# Patient Record
Sex: FEMALE | Race: WHITE | NOT HISPANIC OR LATINO | Employment: OTHER | URBAN - METROPOLITAN AREA
[De-identification: names, ages, dates, MRNs, and addresses within clinical notes are randomized per-mention and may not be internally consistent; named-entity substitution may affect disease eponyms.]

---

## 2017-08-23 ENCOUNTER — HISTORICAL (OUTPATIENT)
Dept: ADMINISTRATIVE | Facility: HOSPITAL | Age: 77
End: 2017-08-23

## 2018-02-21 LAB
BUN SERPL-MCNC: 25 MG/DL (ref 7–18)
CALCIUM SERPL-MCNC: 10.6 MG/DL (ref 8.5–10.1)
CHLORIDE SERPL-SCNC: 103 MMOL/L (ref 98–107)
CO2 SERPL-SCNC: 32 MMOL/L (ref 21–32)
CREAT SERPL-MCNC: 0.9 MG/DL (ref 0.6–1.3)
GLUCOSE SERPL-MCNC: 132 MG/DL (ref 74–106)
POTASSIUM SERPL-SCNC: 3.7 MMOL/L (ref 3.5–5.1)
SODIUM SERPL-SCNC: 143 MEQ/L (ref 131–145)

## 2018-04-09 ENCOUNTER — HISTORICAL (OUTPATIENT)
Dept: ADMINISTRATIVE | Facility: HOSPITAL | Age: 78
End: 2018-04-09

## 2018-04-09 LAB — CALCIUM SERPL-MCNC: 10.4 MG/DL (ref 8.5–10)

## 2018-10-23 ENCOUNTER — HISTORICAL (OUTPATIENT)
Dept: ADMINISTRATIVE | Facility: HOSPITAL | Age: 78
End: 2018-10-23

## 2018-10-23 LAB
BUN SERPL-MCNC: 25 MG/DL (ref 7–18)
CALCIUM SERPL-MCNC: 10.3 MG/DL (ref 8.5–10)
CHLORIDE SERPL-SCNC: 103 MMOL/L (ref 98–107)
CO2 SERPL-SCNC: 34 MMOL/L (ref 21–32)
CREAT SERPL-MCNC: 0.9 MG/DL (ref 0.6–1.3)
CREAT/UREA NIT SERPL: 27.8
GLUCOSE SERPL-MCNC: 154 MG/DL (ref 74–106)
POTASSIUM SERPL-SCNC: 4 MMOL/L (ref 3.5–5.1)
SODIUM SERPL-SCNC: 141 MMOL/L (ref 136–145)

## 2019-02-18 ENCOUNTER — HISTORICAL (OUTPATIENT)
Dept: ADMINISTRATIVE | Facility: HOSPITAL | Age: 79
End: 2019-02-18

## 2019-02-18 ENCOUNTER — HISTORICAL (OUTPATIENT)
Dept: LAB | Facility: HOSPITAL | Age: 79
End: 2019-02-18

## 2019-02-18 LAB
ABS NEUT (OLG): 8.7 X10(3)/MCL (ref 2.1–9.2)
ALBUMIN SERPL-MCNC: 4 GM/DL (ref 3.4–5)
ALBUMIN/GLOB SERPL: 1.38 {RATIO} (ref 1.5–2.5)
ALP SERPL-CCNC: 63 UNIT/L (ref 38–126)
ALT SERPL-CCNC: 15 UNIT/L (ref 7–52)
APPEARANCE, UA: ABNORMAL
AST SERPL-CCNC: 15 UNIT/L (ref 15–37)
BACTERIA #/AREA URNS AUTO: ABNORMAL /HPF
BILIRUB SERPL-MCNC: 0.5 MG/DL (ref 0.2–1)
BILIRUB UR QL STRIP: NEGATIVE MG/DL
BILIRUBIN DIRECT+TOT PNL SERPL-MCNC: 0.1 MG/DL (ref 0–0.5)
BILIRUBIN DIRECT+TOT PNL SERPL-MCNC: 0.4 MG/DL
BUN SERPL-MCNC: 25 MG/DL (ref 7–18)
CALCIUM SERPL-MCNC: 10.2 MG/DL (ref 8.5–10)
CHLORIDE SERPL-SCNC: 99 MMOL/L (ref 98–107)
CHOLEST SERPL-MCNC: 187 MG/DL (ref 0–200)
CHOLEST/HDLC SERPL: 3.3 {RATIO}
CO2 SERPL-SCNC: 31 MMOL/L (ref 21–32)
COLOR UR: YELLOW
CREAT SERPL-MCNC: 1.24 MG/DL (ref 0.6–1.3)
ERYTHROCYTE [DISTWIDTH] IN BLOOD BY AUTOMATED COUNT: 12.9 % (ref 11.5–17)
EST. AVERAGE GLUCOSE BLD GHB EST-MCNC: 143 MG/DL
GLOBULIN SER-MCNC: 2.9 GM/DL (ref 1.2–3)
GLUCOSE (UA): NEGATIVE MG/DL
GLUCOSE SERPL-MCNC: 177 MG/DL (ref 74–106)
HBA1C MFR BLD: 6.6 % (ref 4.4–6.4)
HCT VFR BLD AUTO: 41.6 % (ref 37–47)
HDLC SERPL-MCNC: 57 MG/DL (ref 35–60)
HGB BLD-MCNC: 12.8 GM/DL (ref 12–16)
HGB UR QL STRIP: ABNORMAL UNIT/L
KETONES UR QL STRIP: NEGATIVE MG/DL
LDLC SERPL CALC-MCNC: 90 MG/DL (ref 0–129)
LEUKOCYTE ESTERASE UR QL STRIP: NEGATIVE UNIT/L
LYMPHOCYTES # BLD AUTO: 1.7 X10(3)/MCL (ref 0.6–3.4)
LYMPHOCYTES NFR BLD AUTO: 14.5 % (ref 13–40)
MCH RBC QN AUTO: 29 PG (ref 27–31.2)
MCHC RBC AUTO-ENTMCNC: 31 GM/DL (ref 32–36)
MCV RBC AUTO: 94 FL (ref 80–94)
MONOCYTES # BLD AUTO: 1 X10(3)/MCL (ref 0.1–1.3)
MONOCYTES NFR BLD AUTO: 9.2 % (ref 0.1–24)
NEUTROPHILS NFR BLD AUTO: 76.3 % (ref 47–80)
NITRITE UR QL STRIP.AUTO: NEGATIVE
PH UR STRIP: 5 [PH]
PLATELET # BLD AUTO: 294 X10(3)/MCL (ref 130–400)
PMV BLD AUTO: 10.6 FL (ref 9.4–12.4)
POTASSIUM SERPL-SCNC: 3.9 MMOL/L (ref 3.5–5.1)
PROT SERPL-MCNC: 6.9 GM/DL (ref 6.4–8.2)
PROT UR QL STRIP: NEGATIVE MG/DL
RBC # BLD AUTO: 4.41 X10(6)/MCL (ref 4.2–5.4)
RBC #/AREA URNS HPF: ABNORMAL /HPF
SODIUM SERPL-SCNC: 138 MMOL/L (ref 136–145)
SP GR UR STRIP: 1.02
SQUAMOUS EPITHELIAL, UA: ABNORMAL /LPF
TRIGL SERPL-MCNC: 178 MG/DL (ref 30–150)
UROBILINOGEN UR STRIP-ACNC: 0.2 MG/DL
VLDLC SERPL CALC-MCNC: 35.6 MG/DL
WBC # SPEC AUTO: 11.4 X10(3)/MCL (ref 4.5–11.5)
WBC #/AREA URNS AUTO: ABNORMAL /[HPF]

## 2019-02-19 ENCOUNTER — HISTORICAL (OUTPATIENT)
Dept: LAB | Facility: HOSPITAL | Age: 79
End: 2019-02-19

## 2019-02-21 LAB — FINAL CULTURE: NORMAL

## 2019-03-27 ENCOUNTER — HISTORICAL (OUTPATIENT)
Dept: LAB | Facility: HOSPITAL | Age: 79
End: 2019-03-27

## 2019-03-27 ENCOUNTER — HISTORICAL (OUTPATIENT)
Dept: ADMINISTRATIVE | Facility: HOSPITAL | Age: 79
End: 2019-03-27

## 2019-03-27 LAB
ALBUMIN SERPL-MCNC: 3.8 GM/DL (ref 3.4–5)
CALCIUM SERPL-MCNC: 9.6 MG/DL (ref 8.5–10)
PROT SERPL-MCNC: 6.4 GM/DL (ref 6.4–8.2)
PTH-INTACT SERPL-MCNC: 167.9 PG/ML (ref 18.4–80.1)

## 2019-03-28 LAB
CREAT SERPL-MCNC: 1.07 MG/DL (ref 0.6–1.3)
DEPRECATED CALCIDIOL+CALCIFEROL SERPL-MC: 19.3 NG/ML (ref 30–80)

## 2019-05-06 ENCOUNTER — HISTORICAL (OUTPATIENT)
Dept: ADMINISTRATIVE | Facility: HOSPITAL | Age: 79
End: 2019-05-06

## 2019-05-06 ENCOUNTER — HISTORICAL (OUTPATIENT)
Dept: LAB | Facility: HOSPITAL | Age: 79
End: 2019-05-06

## 2019-05-06 LAB
CALCIUM SERPL-MCNC: 8.7 MG/DL (ref 8.5–10)
MAGNESIUM SERPL-MCNC: 2.2 MG/DL (ref 1.8–2.4)
PHOSPHATE SERPL-MCNC: 2.9 MG/DL (ref 2.5–4.9)
PTH-INTACT SERPL-MCNC: 47.4 PG/ML (ref 18.4–80.1)

## 2019-11-21 ENCOUNTER — HISTORICAL (OUTPATIENT)
Dept: ADMINISTRATIVE | Facility: HOSPITAL | Age: 79
End: 2019-11-21

## 2019-11-21 LAB
ALBUMIN SERPL-MCNC: 4.2 GM/DL (ref 3.4–5)
ALBUMIN/GLOB SERPL: 1.14 {RATIO} (ref 1.5–2.5)
ALP SERPL-CCNC: 65 UNIT/L (ref 38–126)
ALT SERPL-CCNC: 18 UNIT/L (ref 7–52)
AST SERPL-CCNC: 18 UNIT/L (ref 15–37)
BILIRUB SERPL-MCNC: 0.4 MG/DL (ref 0.2–1)
BILIRUBIN DIRECT+TOT PNL SERPL-MCNC: 0.1 MG/DL (ref 0–0.5)
BILIRUBIN DIRECT+TOT PNL SERPL-MCNC: 0.3 MG/DL
BUN SERPL-MCNC: 24 MG/DL (ref 7–18)
CALCIUM SERPL-MCNC: 9.2 MG/DL (ref 8.5–10)
CHLORIDE SERPL-SCNC: 102 MMOL/L (ref 98–107)
CO2 SERPL-SCNC: 34 MMOL/L (ref 21–32)
CREAT SERPL-MCNC: 1.24 MG/DL (ref 0.6–1.3)
EST. AVERAGE GLUCOSE BLD GHB EST-MCNC: 146 MG/DL
GLOBULIN SER-MCNC: 3.7 GM/DL (ref 1.2–3)
GLUCOSE SERPL-MCNC: 119 MG/DL (ref 74–106)
HBA1C MFR BLD: 6.7 % (ref 4.4–6.4)
POTASSIUM SERPL-SCNC: 3.6 MMOL/L (ref 3.5–5.1)
PROT SERPL-MCNC: 7.9 GM/DL (ref 6.4–8.2)
SODIUM SERPL-SCNC: 142 MMOL/L (ref 136–145)

## 2020-02-06 ENCOUNTER — HISTORICAL (OUTPATIENT)
Dept: ADMINISTRATIVE | Facility: HOSPITAL | Age: 80
End: 2020-02-06

## 2020-02-06 LAB
ALBUMIN SERPL-MCNC: 3.8 GM/DL (ref 3.4–5)
ALP SERPL-CCNC: 51 UNIT/L (ref 38–126)
ALT SERPL-CCNC: 18 UNIT/L (ref 7–52)
AST SERPL-CCNC: 19 UNIT/L (ref 15–37)
BILIRUB SERPL-MCNC: 0.4 MG/DL (ref 0.2–1)
BILIRUBIN DIRECT+TOT PNL SERPL-MCNC: 0.1 MG/DL (ref 0–0.5)
BILIRUBIN DIRECT+TOT PNL SERPL-MCNC: 0.3 MG/DL
CHOLEST SERPL-MCNC: 173 MG/DL (ref 0–200)
CHOLEST/HDLC SERPL: 3 {RATIO}
HDLC SERPL-MCNC: 57 MG/DL (ref 35–60)
LDLC SERPL CALC-MCNC: 99 MG/DL (ref 0–129)
PROT SERPL-MCNC: 6.5 GM/DL (ref 6.4–8.2)
TRIGL SERPL-MCNC: 102 MG/DL (ref 30–150)
VLDLC SERPL CALC-MCNC: 20.4 MG/DL

## 2020-07-10 ENCOUNTER — HISTORICAL (OUTPATIENT)
Dept: ADMINISTRATIVE | Facility: HOSPITAL | Age: 80
End: 2020-07-10

## 2020-07-10 LAB
ABS NEUT (OLG): 3.3 X10(3)/MCL (ref 2.1–9.2)
ALBUMIN SERPL-MCNC: 3.9 GM/DL (ref 3.4–5)
ALBUMIN/GLOB SERPL: 1.3 {RATIO} (ref 1.5–2.5)
ALP SERPL-CCNC: 59 UNIT/L (ref 38–126)
ALT SERPL-CCNC: 15 UNIT/L (ref 7–52)
AST SERPL-CCNC: 16 UNIT/L (ref 15–37)
BILIRUB SERPL-MCNC: 0.4 MG/DL (ref 0.2–1)
BILIRUBIN DIRECT+TOT PNL SERPL-MCNC: 0.1 MG/DL (ref 0–0.5)
BILIRUBIN DIRECT+TOT PNL SERPL-MCNC: 0.3 MG/DL
BUN SERPL-MCNC: 28 MG/DL (ref 7–18)
CALCIUM SERPL-MCNC: 10 MG/DL (ref 8.5–10)
CHLORIDE SERPL-SCNC: 101 MMOL/L (ref 98–107)
CHOLEST SERPL-MCNC: 165 MG/DL (ref 0–200)
CHOLEST/HDLC SERPL: 2.9 {RATIO}
CO2 SERPL-SCNC: 33 MMOL/L (ref 21–32)
CREAT SERPL-MCNC: 1.02 MG/DL (ref 0.6–1.3)
DEPRECATED CALCIDIOL+CALCIFEROL SERPL-MC: 20.6 NG/ML (ref 30–80)
ERYTHROCYTE [DISTWIDTH] IN BLOOD BY AUTOMATED COUNT: 13.2 % (ref 11.5–17)
EST. AVERAGE GLUCOSE BLD GHB EST-MCNC: 148 MG/DL
GLOBULIN SER-MCNC: 3 GM/DL (ref 1.2–3)
GLUCOSE SERPL-MCNC: 157 MG/DL (ref 74–106)
HBA1C MFR BLD: 6.8 % (ref 4.4–6.4)
HCT VFR BLD AUTO: 39.7 % (ref 37–47)
HDLC SERPL-MCNC: 56 MG/DL (ref 35–60)
HGB BLD-MCNC: 12.5 GM/DL (ref 12–16)
LDLC SERPL CALC-MCNC: 82 MG/DL (ref 0–129)
LYMPHOCYTES # BLD AUTO: 2.5 X10(3)/MCL (ref 0.6–3.4)
LYMPHOCYTES NFR BLD AUTO: 38.4 % (ref 13–40)
MCH RBC QN AUTO: 28.2 PG (ref 27–31.2)
MCHC RBC AUTO-ENTMCNC: 32 GM/DL (ref 32–36)
MCV RBC AUTO: 89 FL (ref 80–94)
MONOCYTES # BLD AUTO: 0.6 X10(3)/MCL (ref 0.1–1.3)
MONOCYTES NFR BLD AUTO: 9.3 % (ref 0.1–24)
NEUTROPHILS NFR BLD AUTO: 52.3 % (ref 47–80)
PLATELET # BLD AUTO: 276 X10(3)/MCL (ref 130–400)
PMV BLD AUTO: 10.9 FL (ref 9.4–12.4)
POTASSIUM SERPL-SCNC: 3.6 MMOL/L (ref 3.5–5.1)
PROT SERPL-MCNC: 6.9 GM/DL (ref 6.4–8.2)
RBC # BLD AUTO: 4.44 X10(6)/MCL (ref 4.2–5.4)
SODIUM SERPL-SCNC: 143 MMOL/L (ref 136–145)
TRIGL SERPL-MCNC: 135 MG/DL (ref 30–150)
VLDLC SERPL CALC-MCNC: 27 MG/DL
WBC # SPEC AUTO: 6.4 X10(3)/MCL (ref 4.5–11.5)

## 2020-12-23 ENCOUNTER — HISTORICAL (OUTPATIENT)
Dept: ADMINISTRATIVE | Facility: HOSPITAL | Age: 80
End: 2020-12-23

## 2020-12-23 LAB
DEPRECATED CALCIDIOL+CALCIFEROL SERPL-MC: 36.5 NG/ML (ref 30–80)
EST. AVERAGE GLUCOSE BLD GHB EST-MCNC: 166 MG/DL
HBA1C MFR BLD: 7.4 % (ref 4.4–6.4)
TSH SERPL-ACNC: 3.52 MIU/ML (ref 0.35–4.94)

## 2021-02-26 ENCOUNTER — HISTORICAL (OUTPATIENT)
Dept: ADMINISTRATIVE | Facility: HOSPITAL | Age: 81
End: 2021-02-26

## 2021-02-26 LAB — URATE SERPL-MCNC: 8.8 MG/DL (ref 2.6–7.2)

## 2021-03-10 ENCOUNTER — HISTORICAL (OUTPATIENT)
Dept: ADMINISTRATIVE | Facility: HOSPITAL | Age: 81
End: 2021-03-10

## 2021-07-20 ENCOUNTER — HISTORICAL (OUTPATIENT)
Dept: ADMINISTRATIVE | Facility: HOSPITAL | Age: 81
End: 2021-07-20

## 2021-07-20 LAB
ALBUMIN SERPL-MCNC: 3.9 GM/DL (ref 3.4–5)
ALBUMIN/GLOB SERPL: 1.5 {RATIO} (ref 1.5–2.5)
ALP SERPL-CCNC: 52 UNIT/L (ref 38–126)
ALT SERPL-CCNC: 21 UNIT/L (ref 7–52)
AST SERPL-CCNC: 21 UNIT/L (ref 15–37)
BILIRUB SERPL-MCNC: 0.5 MG/DL (ref 0.2–1)
BILIRUBIN DIRECT+TOT PNL SERPL-MCNC: 0.1 MG/DL (ref 0–0.5)
BILIRUBIN DIRECT+TOT PNL SERPL-MCNC: 0.4 MG/DL
BUN SERPL-MCNC: 27 MG/DL (ref 7–18)
CALCIUM SERPL-MCNC: 9.6 MG/DL (ref 8.5–10)
CHLORIDE SERPL-SCNC: 100 MMOL/L (ref 98–107)
CHOLEST SERPL-MCNC: 151 MG/DL (ref 0–200)
CHOLEST/HDLC SERPL: 2.8 {RATIO}
CO2 SERPL-SCNC: 33 MMOL/L (ref 21–32)
CREAT SERPL-MCNC: 1.09 MG/DL (ref 0.6–1.3)
CREAT UR-MCNC: 300 MG/DL
EST. AVERAGE GLUCOSE BLD GHB EST-MCNC: 163 MG/DL
GLOBULIN SER-MCNC: 2.6 GM/DL (ref 1.2–3)
GLUCOSE SERPL-MCNC: 167 MG/DL (ref 74–106)
HBA1C MFR BLD: 7.3 % (ref 4.4–6.4)
HDLC SERPL-MCNC: 54 MG/DL (ref 35–60)
LDLC SERPL CALC-MCNC: 78 MG/DL (ref 0–129)
MICROALBUMIN UR-MCNC: 150 MG/L
MICROALBUMIN/CREAT RATIO PNL UR: ABNORMAL MG/GM
POTASSIUM SERPL-SCNC: 3.3 MMOL/L (ref 3.5–5.1)
PROT SERPL-MCNC: 6.5 GM/DL (ref 6.4–8.2)
SODIUM SERPL-SCNC: 142 MMOL/L (ref 136–145)
TRIGL SERPL-MCNC: 122 MG/DL (ref 30–150)
VLDLC SERPL CALC-MCNC: 24.4 MG/DL

## 2022-04-10 ENCOUNTER — HISTORICAL (OUTPATIENT)
Dept: ADMINISTRATIVE | Facility: HOSPITAL | Age: 82
End: 2022-04-10

## 2022-04-26 VITALS
HEIGHT: 65 IN | SYSTOLIC BLOOD PRESSURE: 126 MMHG | WEIGHT: 220.44 LBS | BODY MASS INDEX: 36.73 KG/M2 | DIASTOLIC BLOOD PRESSURE: 72 MMHG

## 2022-04-29 NOTE — OP NOTE
DATE OF SURGERY:    08/23/2017    SURGEON:  Cameron Hall MD    PROCEDURE:  DC cardioversion with ANABELL.    PROCEDURE IN DETAIL:  A ANABELL DC cardioversion was performed for the indications of poorly tolerated atrial fibrillation.       The patient was taken to the postoperative recovery area and sedated under the supervision of anesthesiology.  A ANABELL was performed, which demonstrated no evidence of left atrial spontaneous echo contrast.  The left atrial appendage was clearly visualized with reduced contractile function. However, there was no evidence of thrombus formation.  Following ANABELL examination, the probe was removed and 200 J of synchronous energy was unsuccessfully delivered, and there was maintenance of underlying atrial fibrillation.  The leads were adjusted and an additional 200 J of synchronous energy was delivered with prompt return of sinus rhythm.  An EKG was performed and the patient was returned to her room after appropriate recovery.  She was scheduled to be discharged.  EKG was performed following cardioversion and there were no procedural complications.        ______________________________  MD LAYLA Wisdom/CD  DD:  08/24/2017  Time:  06:34AM  DT:  08/24/2017  Time:  12:22PM  Job #:  [id number]

## 2022-05-02 NOTE — HISTORICAL [facility] CERNER
This is a historical note converted from Angy. Formatting and pictures may have been removed.  Please reference Angy for original formatting and attached multimedia. Chief Complaint  back pain right side for 4 days and increased urination  History of Present Illness  sudden onset of right middle/lateral back while walking out the door to head home;  also at that time had a lot of urinary frequency with very small amounts of urine all weekend;  having sinus congestion/cough/cold symptoms for past couple weeks, seems to be getting a little better;  increased thirst/  also has pica for ice  ?  Review of Systems  ?14 point Review of Systems performed with no exceptions for new complaints other than as noted in HPI.  Physical Exam  Vitals & Measurements  HR:?80(Peripheral)? BP:?128/78?  HT:?165?cm? WT:?98?kg? BMI:?36?  WDWN, NAD, alert and oriented  Eyes Conjunctiva WNL, no drainage  Sinuses - nontender, no drainage  TMs clear  Oropharynx WNL  Neck with no lymphadenopathy  Lungs CTA  Heart RRR  Abdomen/back benign,mild tenderness right lateral flank and right mid upper abdomen  Assessment/Plan  1.?Urinary frequency?R35.0  ?will check ua  2.?Polydipsia?R63.1  as above  3.?Pica?F50.89  will check cbc  4.?Hyperglycemia?R73.9  ?will check sugar  5.?Hypercalcemia?E83.52  will recheck  6.?Elevated cholesterol?E78.00  will?recheck   Problem List/Past Medical History  Ongoing  Atrial fibrillation  Bilateral carotid bruits  Chronic idiopathic urticaria  Coronary artery stenosis  Depression  Elevated cholesterol  Genital herpes simplex type 2  Glaucoma  History of transesophageal echocardiography (ANABELL)  HTN - Hypertension  Hypercalcemia  Hyperglycemia  Hypertension  Hypothyroid  Mitral valve regurgitation  Pica  Polydipsia  RBBB  Tortuous artery  Type 2 diabetes mellitus  Urinary frequency  Historical  No qualifying data  Procedure/Surgical History  Mammogram (11/19/2017)  Cardioversion (.) (08/23/2017)  Transesophageal Echo  (for Surgery) (.) (08/23/2017)  Colonoscopy (11/11/2011)  CT of heart without contrast with calcium scoring  Excision of squamous cell carcinoma  Hysterectomy  laser eye surgery for glaucoma   Medications  amiodarone 200 mg oral Tab, 200 mg= 1 tab(s), Oral, BID  aspirin 81 mg oral Delayed Release (EC) tablet, 81 mg= 1 tab(s), Oral, Daily  hydrochlorothiazide-valsartan 25 mg-160 mg oral tablet, 1 tab(s), Oral, Daily  lovastatin 20 mg oral tablet, extended release, 20 mg= 1 tab(s), Oral, Once a day (at bedtime)  metoprolol succinate 25 mg oral tablet extended release, 12.5 mg= 0.5 tab(s), Oral, BID  Premarin 0.625 mg/g vaginal cream with applicator, See Instructions, 3 refills  Premarin 0.625 mg/g vaginal cream with applicator, See Instructions, 3 refills  Xarelto 20mg Tablet, 20 mg= 1 tab(s), Oral, qPM  Allergies  No Known Medication Allergies  Social History  Alcohol  Never, 08/23/2017  Employment/School  Employed, 02/18/2019  Home/Environment  Lives with Alone., 02/18/2019  Substance Abuse  Never, 02/18/2019  Tobacco  Former smoker, Cigarettes, 3 per day., 08/23/2017  Family History  Alcoholism: Sister.  CAD - Coronary artery disease: Father.  Congestive heart failure: Father.  Lung cancer: Mother.  Schizophrenia: Sister.  Immunizations  Vaccine Date Status   influenza virus vaccine, inactivated 12/01/2018 Recorded   pneumococcal 13-valent conjugate vaccine 07/18/2017 Recorded   pneumococcal 23-polyvalent vaccine 03/15/2016 Recorded   influenza virus vaccine, inactivated 11/22/2014 Recorded   influenza virus vaccine, inactivated 09/17/2011 Recorded   pneumococcal 23-polyvalent vaccine 07/28/2011 Recorded   zoster vaccine live 07/28/2011 Recorded   Health Maintenance  Health Maintenance  ???Pending?(in the next year)  ??? ??OverDue  ??? ? ? ?Coronary Artery Disease Maintenance-Lipid Lowering Therapy due??and every?  ??? ? ? ?Pneumococcal Vaccine due??and every?  ??? ? ? ?Aspirin Therapy for CVD Prevention  due??08/22/18??and every 1??year(s)  ??? ? ? ?Advance Directive due??08/23/18??and every 1??year(s)  ??? ??Due?  ??? ? ? ?ADL Screening due??02/22/19??and every 1??year(s)  ??? ? ? ?Bone Density Screening due??02/22/19??Variable frequency  ??? ? ? ?Cognitive Screening due??02/22/19??and every 1??year(s)  ??? ? ? ?Diabetes Maintenance-Eye Exam due??02/22/19??and every?  ??? ? ? ?Diabetes Maintenance-Foot Exam due??02/22/19??and every?  ??? ? ? ?Diabetes Maintenance-Microalbumin due??02/22/19??Variable frequency  ??? ? ? ?Diabetes Maintenance-Medication Prescribed due??02/22/19??and every 1??year(s)  ??? ? ? ?Fall Risk Assessment due??02/22/19??and every 1??year(s)  ??? ? ? ?Functional Assessment due??02/22/19??and every 1??year(s)  ??? ? ? ?Geriatric Depression Screening due??02/22/19??and every 1??year(s)  ??? ? ? ?Hypertension Management-Education due??02/22/19??and every 1??year(s)  ??? ? ? ?Smoking Cessation due??02/22/19??Variable frequency  ??? ? ? ?Tetanus Vaccine due??02/22/19??and every 10??year(s)  ??? ??Due In Future?  ??? ? ? ?Diabetes Maintenance-Fasting Lipid Profile not due until??02/18/20??and every 1??year(s)  ??? ? ? ?Diabetes Maintenance-HgbA1c not due until??02/18/20??and every 1??year(s)  ??? ? ? ?Hypertension Management-BMP not due until??02/18/20??and every 1??year(s)  ??? ? ? ?Hypertension Management-Blood Pressure not due until??02/18/20??and every 1??year(s)  ??? ? ? ?Obesity Screening not due until??02/18/20??and every 1??year(s)  ??? ? ? ?Diabetes Maintenance-Serum Creatinine not due until??02/18/20??and every 1??year(s)  ??? ? ? ?Diabetes Maintenance-Urine Dipstick not due until??02/18/20??and every 1??year(s)  ???Satisfied?(in the past 1 year)  ??? ??Satisfied?  ??? ? ? ?Blood Pressure Screening on??02/18/19.??Satisfied by Regla Hatfield LPN  ??? ? ? ?Body Mass Index Check on??02/18/19.??Satisfied by Regla Hatfield LPN  ??? ? ? ?Diabetes Maintenance-Fasting Lipid Profile  on??02/18/19.??Satisfied by Rafael Enriquez  ??? ? ? ?Diabetes Maintenance-HgbA1c on??02/18/19.??Satisfied by Neeru Sauceda  ??? ? ? ?Diabetes Maintenance-Serum Creatinine on??02/18/19.??Satisfied by Rafael Enriquez  ??? ? ? ?Diabetes Maintenance-Urine Dipstick on??02/18/19.??Satisfied by Neeru Sauceda  ??? ? ? ?Diabetes Screening on??02/18/19.??Satisfied by Rafael Enriquez  ??? ? ? ?Hypertension Management-BMP on??02/18/19.??Satisfied by Rafael Enriquez  ??? ? ? ?Hypertension Management-Blood Pressure on??02/18/19.??Satisfied by Regla Hatfield LPN  ??? ? ? ?Influenza Vaccine on??12/01/18.??Satisfied by Regla Hatfield LPN  ??? ? ? ?Lipid Screening on??02/18/19.??Satisfied by Rafael Enriquez  ??? ? ? ?Obesity Screening on??02/18/19.??Satisfied by Regla Hatfield LPN  ?  ?

## 2022-05-02 NOTE — HISTORICAL [facility] CERNER
This is a historical note converted from Angy. Formatting and pictures may have been removed.  Please reference Angy for original formatting and attached multimedia. Chief Complaint  wellness  History of Present Illness  She had labs in December with?an A1c of 7.4,?uric acid of 8.8. ?Her vitamin D level was 36, LDL was 82,?and TSH was 3.5.  retired officially on may 7th  no cardio exercise  diet - eats one full meal a day  c/o right sided back pain for a week, no known trauma  ?  colchicine works great for gout flareups, hasnt had a flareup in about a month,  Up-to-date with?colonoscopy, vaccinations, mammogram  Review of Systems  ?14 point Review of Systems performed with no exceptions for new complaints other than as noted in HPI.  Physical Exam  Vitals & Measurements  HR:?88(Peripheral)? BP:?126/72?  HT:?165?cm? HT:?165.00?cm? WT:?100?kg? WT:?100.000?kg? BMI:?36.73?  ?  PHYSICAL EXAM  ?   WDWN patient in NAD, ?AFVSS  HEENT - no acute abnormality  ??????????????? oropharynx WNL  HEART -S1/S2?slightly irregular  LUNGS -? CTA  ABDOMEN - benign, NTND;? no peritoneal signs  EXTREMITIES - No CCE  SKIN - warm, dry, intact  PSYCH - affect appropriate;? alert and oriented  NEURO- no new deficits noted;? cranial nerves grossly intact  tender right flank , slow ROM , sl decr.  Assessment/Plan  1.?Wellness examination?Z00.00  Urged her to?get started on an exercise program that she can be consistent with  Ordered:  Medicare Annual Wellness- Subsequent  PC, Wellness examination, [facility], 07/20/21 8:54:00 CDT  ?  2.?Type 2 diabetes mellitus?E11.9  ?Last A1c was 7.4, recheck A1c and microalbumin today,?keep carb intake down  Ordered:  Clinic Follow up, *Est. 01/20/22 3:00:00 CST, Order for future visit, Type 2 diabetes mellitus, [facility]  Comprehensive Metabolic Panel, Routine collect, 07/20/21 8:54:00 CDT, Blood, Order for future visit, Stop date 07/20/21 8:54:00 CDT, Lab Collect, Type 2 diabetes mellitus,  07/20/21 8:54:00 CDT  Hemoglobin A1c, Routine collect, 07/20/21 8:54:00 CDT, Blood, Order for future visit, Stop date 07/20/21 8:54:00 CDT, Lab Collect, Type 2 diabetes mellitus, 07/20/21 8:54:00 CDT  Lab Collection Request, 07/20/21 8:54:00 CDT, [facility] - [facility], 07/20/21 8:54:00 CDT, Type 2 diabetes mellitus  Microalbumin Urine, Routine collect, Urine, Order for future visit, 07/20/21 8:54:00 CDT, Stop date 07/20/21 8:54:00 CDT, Nurse collect, Type 2 diabetes mellitus  Office/Outpatient Visit Level 3 Established [id number] PC, Type 2 diabetes mellitus  HLD (hyperlipidemia)  Vitamin D deficiency  Hypothyroid  Coronary artery stenosis  Gout of right foot  HTN - Hypertension, [facility] - [facility], 07/20/21 8:54:00 CDT  ?  3.?HLD (hyperlipidemia)?E78.5  ?Recheck lipid profile  Ordered:  Lipid Panel, Routine collect, 07/20/21 8:54:00 CDT, Blood, Order for future visit, Stop date 07/20/21 8:54:00 CDT, Lab Collect, HLD (hyperlipidemia), 07/20/21 8:54:00 CDT  Office/Outpatient Visit Level 3 Established [id number] PC, Type 2 diabetes mellitus  HLD (hyperlipidemia)  Vitamin D deficiency  Hypothyroid  Coronary artery stenosis  Gout of right foot  HTN - Hypertension, [facility], 07/20/21 8:54:00 CDT  ?  4.?Hypothyroid?E03.9  ?Last TSH was 3.5  Ordered:  Office/Outpatient Visit Level 3 Established [id number] PC, Type 2 diabetes mellitus  HLD (hyperlipidemia)  Vitamin D deficiency  Hypothyroid  Coronary artery stenosis  Gout of right foot  HTN - Hypertension, [facility] - [facility], 07/20/21 8:54:00 CDT  ?  5.?Vitamin D deficiency?E55.9  ?Level in December was okay,?continue over-the-counter supplementation  Ordered:  Office/Outpatient Visit Level 3 Established [id number] PC, Type 2 diabetes mellitus  HLD (hyperlipidemia)  Vitamin D deficiency  Hypothyroid  Coronary artery stenosis  Gout of right foot  HTN - Hypertension, [facility] - [facility], 07/20/21 8:54:00 CDT  ?  6.?Coronary artery  stenosis?I25.10  ?Asymptomatic  Ordered:  Office/Outpatient Visit Level 3 Established 91752 PC, Type 2 diabetes mellitus  HLD (hyperlipidemia)  Vitamin D deficiency  Hypothyroid  Coronary artery stenosis  Gout of right foot  HTN - Hypertension, [facility] - [facility], 07/20/21 8:54:00 CDT  ?  7.?Gout of right foot?M10.9  ?May need allopurinol, she feels like?she can control her episodes by?cutting out?so much red meat in her diet  Ordered:  Office/Outpatient Visit Level 3 Established 85775 PC, Type 2 diabetes mellitus  HLD (hyperlipidemia)  Vitamin D deficiency  Hypothyroid  Coronary artery stenosis  Gout of right foot  HTN - Hypertension, [facility] - [facility], 07/20/21 8:54:00 CDT  ?  8.?HTN - Hypertension?I10  ?Good control  Ordered:  Office/Outpatient Visit Level 3 Established [id number] PC, Type 2 diabetes mellitus  HLD (hyperlipidemia)  Vitamin D deficiency  Hypothyroid  Coronary artery stenosis  Gout of right foot  HTN - Hypertension, [facility] - [facility], 07/20/21 8:54:00 CDT  ?  Recommend stretches/topical?analgesics for her back pain, she will call if desires?physical therapy  Referrals  Clinic Follow up, *Est. 01/20/22 3:00:00 CST, Order for future visit, Type 2 diabetes mellitus, [facility]   Problem List/Past Medical History  Ongoing  Atrial fibrillation  Bilateral carotid bruits  Chronic idiopathic urticaria  Coronary artery stenosis  Depression  Genital herpes simplex type 2  Glaucoma  Gout of right foot  History of atrial fibrillation  History of transesophageal echocardiography (ANABELL)  HLD (hyperlipidemia)  HTN - Hypertension  Hypercalcemia  Hypothyroid  Mitral valve regurgitation  Parathyroid adenoma  Pica  Polydipsia  RBBB  Tortuous artery  Type 2 diabetes mellitus  Urinary frequency  Vertigo  Vitamin D deficiency  Historical  No qualifying data  Procedure/Surgical History  Mammogram (11/19/2017)  Cardioversion (.) (08/23/2017)  Cardioversion, elective, electrical conversion of arrhythmia; external  (08/23/2017)  Echocardiography, transesophageal, real-time with image documentation (2D) (with or without M-mode recording); including probe placement, image acquisition, interpretation and report (08/23/2017)  Restoration of Cardiac Rhythm, Single (08/23/2017)  Transesophageal Echo (for Surgery) (.) (08/23/2017)  Ultrasonography of Right and Left Heart, Transesophageal (08/23/2017)  Colonoscopy (11/11/2011)  CT of heart without contrast with calcium scoring  Excision of squamous cell carcinoma  Hysterectomy  laser eye surgery for glaucoma   Medications  aspirin 81 mg oral Delayed Release (EC) tablet, 81 mg= 1 tab(s), Oral, Daily  colchicine 0.6 mg oral tablet, See Instructions, 1 refills  ergocalciferol 50,000 intUnit oral capsule, 18294 IntUnit= 1 cap(s), Oral, qWeek  estradiol 1 mg oral tablet, 1 mg= 1 tab(s), Oral, Daily  flecainide 50 mg oral tablet, 50 mg= 1 tab(s), Oral, BID  hydrochlorothiazide 25 mg oral tablet, 25 mg= 1 tab(s), Oral, Daily  irbesartan 150 mg oral tablet, 150 mg= 1 tab(s), Oral, Daily  lovastatin 20 mg oral tablet, 20 mg= 1 tab(s), Oral, Daily  metoprolol tartrate 25 mg oral tab, 12.5 mg= 0.5 tab(s), Oral, BID  mometasone 0.1% topical cream, See Instructions  Xarelto 20mg Tablet, 20 mg= 1 tab(s), Oral, qPM  Allergies  No Known Medication Allergies  Social History  Abuse/Neglect  No, 07/20/2021  Alcohol  Never, 08/23/2017  Employment/School  Employed, 02/18/2019  Home/Environment  Lives with Alone., 02/18/2019  Substance Use  Never, 02/18/2019  Tobacco  Former smoker, quit more than 30 days ago, N/A, 07/20/2021  Family History  Alcoholism: Sister.  CAD - Coronary artery disease: Father.  Congestive heart failure: Father.  Lung cancer: Mother.  Schizophrenia: Sister.  Immunizations  Vaccine Date Status   COVID-19 MRNA, LNP-S, PF- Pfizer 03/30/2021 Given   COVID-19 MRNA, LNP-S, PF- Pfizer 03/09/2021 Given   influenza virus vaccine, inactivated 09/18/2020 Recorded   influenza virus vaccine,  inactivated 12/15/2019 Recorded   influenza virus vaccine, inactivated 12/01/2018 Recorded   pneumococcal 13-valent conjugate vaccine 07/18/2017 Recorded   pneumococcal 23-polyvalent vaccine 03/15/2016 Recorded   influenza virus vaccine, inactivated 11/22/2014 Recorded   influenza virus vaccine, inactivated 09/17/2011 Recorded   zoster vaccine live 07/28/2011 Recorded   pneumococcal 23-polyvalent vaccine 07/28/2011 Recorded   Health Maintenance  Health Maintenance  ???Pending?(in the next year)  ??? ??OverDue  ??? ? ? ?Influenza Vaccine due??10/01/20??and every 1??day(s)  ??? ? ? ?Advance Directive due??01/02/21??and every 1??year(s)  ??? ? ? ?Cognitive Screening due??01/02/21??and every 1??year(s)  ??? ? ? ?Fall Risk Assessment due??01/02/21??and every 1??year(s)  ??? ? ? ?Functional Assessment due??01/02/21??and every 1??year(s)  ??? ? ? ?Hypertension Management-BMP due??07/10/21??and every 1??year(s)  ??? ? ? ?Diabetes Maintenance-Serum Creatinine due??07/10/21??and every 1??year(s)  ??? ? ? ?Diabetes Maintenance-Fasting Lipid Profile due??07/10/21??and every 1??year(s)  ??? ??Due?  ??? ? ? ?ADL Screening due??07/20/21??and every 1??year(s)  ??? ? ? ?Bone Density Screening due??07/20/21??Variable frequency  ??? ? ? ?Depression Screening due??07/20/21??Unknown Frequency  ??? ? ? ?Diabetes Maintenance-Eye Exam due??07/20/21??Unknown Frequency  ??? ? ? ?Diabetes Maintenance-Foot Exam due??07/20/21??Unknown Frequency  ??? ? ? ?Diabetes Maintenance-Medication Prescribed due??07/20/21??and every 1??year(s)  ??? ? ? ?Hypertension Management-Education due??07/20/21??and every 1??year(s)  ??? ? ? ?Tetanus Vaccine due??07/20/21??and every 10??year(s)  ??? ? ? ?Zoster Vaccine due??07/20/21??Unknown Frequency  ??? ??Due In Future?  ??? ? ? ?Diabetes Maintenance-HgbA1c not due until??12/23/21??and every 1??year(s)  ??? ? ? ?Obesity Screening not due until??01/01/22??and every 1??year(s)  ???Satisfied?(in the past 1 year)  ???  ??Satisfied?  ??? ? ? ?Blood Pressure Screening on??07/20/21.??Satisfied by Regla Hatfield LPN  ??? ? ? ?Body Mass Index Check on??07/20/21.??Satisfied by Regla Hatfield LPN  ??? ? ? ?Breast Cancer Screening on??11/17/20.??Satisfied by Nimo Carvajal  ??? ? ? ?Colorectal Screening on??02/23/21.??Satisfied by Nimo Carvajal  ??? ? ? ?Diabetes Maintenance-HgbA1c on??12/23/20.??Satisfied by Barbara Velazco  ??? ? ? ?Diabetes Screening on??12/23/20.??Satisfied by Barbara Velazco  ??? ? ? ?Hypertension Management-Blood Pressure on??07/20/21.??Satisfied by Regla Hatfield LPN  ??? ? ? ?Influenza Vaccine on??09/18/20.??Satisfied by Regla Hatfield LPN  ??? ? ? ?Medicare Annual Wellness Exam on??07/20/21.??Satisfied by Júnior Villalobos MD  ??? ? ? ?Obesity Screening on??07/20/21.??Satisfied by Regla Hatfield LPN  ?

## 2022-05-02 NOTE — HISTORICAL [facility] CERNER
This is a historical note converted from Angy. Formatting and pictures may have been removed.  Please reference Angy for original formatting and attached multimedia. Chief Complaint  right foot pain  History of Present Illness  80-year-old female presents to office today with complaints of?foot swelling?and foot pain.? Expresses?that this is?been a finding/occurrence for approximately 2-3 days.? Area of pain/discomfort localized to the?right?foot.? Expresses there is some?reddening?and swelling throughout the whole foot however primarily to the area of the?great toe.? Aggravating factors include?weightbearing on this extremity, movement, warm soaks. ?Has not attempted to utilize any OTC medication or prescription medication at this point for the symptoms.? No obvious?trauma or injury to this?area of her foot.? Denies any?known breaks in the skin.? Expresses that she recently?underwent a colonoscopy?procedure for colon cancer screening.? There was some discussion?whether her symptoms?could be attributed to gout?due to the?medication prep?prior to the?colonoscopy.? We can certainly draw a uric acid level?however?given her description and subsequent findings?I suspect cellulitis/abscess?is more likely?diagnosis/culprit.  Review of Systems  ?14 point Review of Systems performed with no exceptions for new complaints other than as noted in HPI.  Physical Exam  ?  PHYSICAL EXAM  Adult  male?seen seated?in?wheelchair in examination room with?right lower extremity elevated.? No?active distress.  HEART - RRR  LUNGS -? CTA  ABDOMEN - benign, NTND;? no peritoneal signs  EXTREMITIES -area of swelling/edema/erythema?throughout the entirety?of the?right foot.? Pain elicited with?palpation/flexion/extension of the?right great toe.? Skin is?warm to touch.? Blanchable.? Capillary refill less than 2 seconds.  PSYCH - affect appropriate;? alert and oriented  NEURO- no new deficits noted;? cranial nerves grossly  intact  ?  Assessment/Plan  1.?Foot swelling?M79.89  ?I suspect?the cause of her swelling?is due to?acute bacterial infection/cellulitis/abscess etc.? We will draw uric acid level?to rule out?gout flareup.  2.?Foot pain?M79.673  ?As above.  3.?Cellulitis?L03.90  ?No?drainable abscess?visualized.? She is on Xarelto?for anticoagulation therapy.? Doxycycline 100 mg oral?capsule?twice daily for 7 days #14 no refills.? We will reach out to her?in approximately 3-4 days?to see if the antibiotics?seem to be working/to see if her?foot swelling is decreasing?etc.? ER precautions for?increased/ascending?redness/swelling.   Problem List/Past Medical History  Ongoing  Atrial fibrillation  Bilateral carotid bruits  Chronic idiopathic urticaria  Coronary artery stenosis  Depression  Genital herpes simplex type 2  Glaucoma  History of atrial fibrillation  History of transesophageal echocardiography ([facility])  HLD (hyperlipidemia)  HTN - Hypertension  Hypercalcemia  Hypothyroid  Mitral valve regurgitation  Parathyroid adenoma  Pica  Polydipsia  RBBB  Tortuous artery  Type 2 diabetes mellitus  Urinary frequency  Vertigo  Vitamin D deficiency  Historical  No qualifying data  Procedure/Surgical History  Mammogram (11/19/2017)  Cardioversion (.) (08/23/2017)  Cardioversion, elective, electrical conversion of arrhythmia; external (08/23/2017)  Echocardiography, transesophageal, real-time with image documentation (2D) (with or without M-mode recording); including probe placement, image acquisition, interpretation and report (08/23/2017)  Restoration of Cardiac Rhythm, Single (08/23/2017)  Transesophageal Echo (for Surgery) (.) (08/23/2017)  Ultrasonography of Right and Left Heart, Transesophageal (08/23/2017)  Colonoscopy (11/11/2011)  CT of heart without contrast with calcium scoring  Excision of squamous cell carcinoma  Hysterectomy  laser eye surgery for glaucoma   Medications  aspirin 81 mg oral Delayed Release (EC) tablet, 81 mg= 1  tab(s), Oral, Daily  doxycycline monohydrate 100 mg oral tablet, 100 mg= 1 tab(s), Oral, BID, 1 refills  ergocalciferol 50,000 intUnit oral capsule, 37666 IntUnit= 1 cap(s), Oral, qWeek  estradiol 1 mg oral tablet, 1 mg= 1 tab(s), Oral, Daily, 1 refills  flecainide 50 mg oral tablet, 50 mg= 1 tab(s), Oral, BID  hydrochlorothiazide 25 mg oral tablet, 25 mg= 1 tab(s), Oral, Daily  irbesartan 150 mg oral tablet, 150 mg= 1 tab(s), Oral, Daily  lovastatin 20 mg oral tablet, 20 mg= 1 tab(s), Oral, Daily  metoprolol tartrate 25 mg oral tab, 12.5 mg= 0.5 tab(s), Oral, BID  mometasone 0.1% topical cream, See Instructions  Xarelto 20mg Tablet, 20 mg= 1 tab(s), Oral, qPM  Allergies  No Known Medication Allergies  Social History  Abuse/Neglect  No, 02/26/2021  Alcohol  Never, 08/23/2017  Employment/School  Employed, 02/18/2019  Home/Environment  Lives with Alone., 02/18/2019  Substance Use  Never, 02/18/2019  Tobacco  Former smoker, quit more than 30 days ago, N/A, 02/26/2021  Family History  Alcoholism: Sister.  CAD - Coronary artery disease: Father.  Congestive heart failure: Father.  Lung cancer: Mother.  Schizophrenia: Sister.  Immunizations  Vaccine Date Status   influenza virus vaccine, inactivated 12/15/2019 Recorded   influenza virus vaccine, inactivated 12/01/2018 Recorded   pneumococcal 13-valent conjugate vaccine 07/18/2017 Recorded   pneumococcal 23-polyvalent vaccine 03/15/2016 Recorded   influenza virus vaccine, inactivated 11/22/2014 Recorded   influenza virus vaccine, inactivated 09/17/2011 Recorded   zoster vaccine live 07/28/2011 Recorded   pneumococcal 23-polyvalent vaccine 07/28/2011 Recorded   Health Maintenance  Health Maintenance  ???Pending?(in the next year)  ??? ??OverDue  ??? ? ? ?Influenza Vaccine due??10/01/20??and every 1??day(s)  ??? ? ? ?Advance Directive due??01/02/21??and every 1??year(s)  ??? ? ? ?Cognitive Screening due??01/02/21??and every 1??year(s)  ??? ? ? ?Fall Risk Assessment  due??01/02/21??and every 1??year(s)  ??? ? ? ?Functional Assessment due??01/02/21??and every 1??year(s)  ??? ??Due?  ??? ? ? ?Obesity Screening due??01/01/21??and every 1??year(s)  ??? ? ? ?ADL Screening due??02/28/21??and every 1??year(s)  ??? ? ? ?Bone Density Screening due??02/28/21??Variable frequency  ??? ? ? ?Depression Screening due??02/28/21??Unknown Frequency  ??? ? ? ?Diabetes Maintenance-Eye Exam due??02/28/21??Unknown Frequency  ??? ? ? ?Diabetes Maintenance-Foot Exam due??02/28/21??Unknown Frequency  ??? ? ? ?Diabetes Maintenance-Medication Prescribed due??02/28/21??and every 1??year(s)  ??? ? ? ?Hypertension Management-Education due??02/28/21??and every 1??year(s)  ??? ? ? ?Medicare Annual Wellness Exam due??02/28/21??and every 1??year(s)  ??? ? ? ?Tetanus Vaccine due??02/28/21??and every 10??year(s)  ??? ? ? ?Zoster Vaccine due??02/28/21??Unknown Frequency  ??? ??Due In Future?  ??? ? ? ?Hypertension Management-BMP not due until??07/10/21??and every 1??year(s)  ??? ? ? ?Diabetes Maintenance-Fasting Lipid Profile not due until??07/10/21??and every 1??year(s)  ??? ? ? ?Diabetes Maintenance-Serum Creatinine not due until??07/10/21??and every 1??year(s)  ??? ? ? ?Body Mass Index Check not due until??07/14/21??and every 1??year(s)  ??? ? ? ?Blood Pressure Screening not due until??12/23/21??and every 1??year(s)  ??? ? ? ?Diabetes Maintenance-HgbA1c not due until??12/23/21??and every 1??year(s)  ??? ? ? ?Hypertension Management-Blood Pressure not due until??12/23/21??and every 1??year(s)  ???Satisfied?(in the past 1 year)  ??? ??Satisfied?  ??? ? ? ?Blood Pressure Screening on??12/23/20.??Satisfied by Poppy Maxwell MA  ??? ? ? ?Body Mass Index Check on??07/14/20.??Satisfied by Regla Hatfield LPN  ??? ? ? ?Breast Cancer Screening on??11/17/20.??Satisfied by Nimo Carvajal  ??? ? ? ?Diabetes Maintenance-Fasting Lipid Profile on??07/10/20.??Satisfied by Rafael Enriquez  ??? ? ? ?Diabetes Maintenance-HgbA1c  on??12/23/20.??Satisfied by Barbara Velazco  ??? ? ? ?Diabetes Maintenance-Serum Creatinine on??07/10/20.??Satisfied by Rafael Enriquez  ??? ? ? ?Diabetes Screening on??12/23/20.??Satisfied by Barbara Velazco  ??? ? ? ?Hypertension Management-Blood Pressure on??12/23/20.??Satisfied by Poppy Maxwell MA  ??? ? ? ?Hypertension Management-BMP on??07/10/20.??Satisfied by Rafael Enriquez  ??? ? ? ?Lipid Screening on??07/10/20.??Satisfied by Rafael Enriquez  ??? ? ? ?Obesity Screening on??07/14/20.??Satisfied by Regla Hatfield LPN  ?      Physician?was in the building when patient was?seen and examined by the nurse practitioner.? I concur with the?assessment/plan/management?of the patient.

## 2022-05-02 NOTE — HISTORICAL [facility]
This is a historical note converted from Angy. Formatting and pictures may have been removed.  Please reference Angy for original formatting and attached multimedia. Chief Complaint  rash  History of Present Illness  Patient is here for c/o of rash that she?has been struggling with off and on for the past few months, however the past week seems to have worsened and has not subsided. Rash in inside vaginal lips mainly on the left side- states very painful when she goes to the restroom when urine gets on area. States has been wearing pad to help with leakage and thinks that this may have caused some irritation and may have worsened the rash. Tried OTC Boudreauxs butt paste and states she feels like this has increased the pain, the only thing that has given some relief is Vaseline. Does have a history of atrophic vaginitis which she has been using vaginal?Premarin as directed with some relief. At times does admit she has some itching has not noticed any discharge or odor.  Discussed DM and states she was not aware she was diabetic and has not been monitoring her diet, however states she is not a big sugar/starch eater- but has noticed an increase in thirst quintin at night time  Physical Exam  Vitals & Measurements  HR:?80(Peripheral)? BP:?138/80?  HT:?165?cm? WT:?97.52?kg? BMI:?35.82?  General- In NAD, A&O x 4  ?   Respiratory- CTA, No wheezing, No crackles, No rhonchi  ?   Cardiovascular- RRR W/O MGR, Pulses equal throughout  ?   Integumentary- Warm, dry, intact, No lesions/rashes/hives  ?   Vaginal canal/labia minora, mainly to the left side, very erythematous and irritated almost appears ulcerated,? with lesion noted, small irritation note to right side as well, very tender on exam, yeasty discharge also noted  Assessment/Plan  1.?Type 2 diabetes mellitus?E11.9  1. Repeat A1C today  2. Enc to monitor diet of sugar/carb/starch intake  Ordered:  Office/Outpatient Visit Level 3 Established [id number] PC, Type 2 diabetes  mellitus  Atrophic vaginitis  Candidiasis, [facility], 11/21/19 13:39:00 CST  ?  2.?Atrophic vaginitis?N95.2  1. Enc moisturizing lubricants as directed  Ordered:  Clinic Follow up, *Est. 12/05/19 3:00:00 CST, Order for future visit, Candidiasis  Atrophic vaginitis, [facility]  Office/Outpatient Visit Level 3 Established [id number] , Type 2 diabetes mellitus  Atrophic vaginitis  Candidiasis, [facility], 11/21/19 13:39:00 CST  ?  3.?Candidiasis?B37.9  1. Will treat with OTC Monistat as directed and Diflucan 150mg po now then repeat dose in 72 hours  2. F/U OV in 2 weeks for exam  3. Pending exam if remains irritated and ulcerated will refer to gyn for further?eval  Ordered:  Clinic Follow up, *Est. 12/05/19 3:00:00 CST, Order for future visit, Candidiasis  Atrophic vaginitis, [facility]  Office/Outpatient Visit Level 3 Established 30428 , Type 2 diabetes mellitus  Atrophic vaginitis  Candidiasis, [facility], 11/21/19 13:39:00 CST  ?  4.?Vaginitis?N76.0  1. See above treatment  2. Enc to keep area clean and dry as possible  3. No pads if can avoid when at home- no underwear if possible  ?  Orders:  fluconazole, See Instructions, 1 tab(s) Oral x 1 then repeat dose in 72 hours, # 2 tab(s), 0 Refill(s), Pharmacy: [facility]/pharmacy #[id number]  Clinic Follow-up PRN, 11/21/19 13:39:00 CST, [facility], Future Order  Referrals  Clinic Follow up, *Est. 12/05/19 3:00:00 CST, Order for future visit, Candidiasis  Atrophic vaginitis, [facility]  Clinic Follow-up PRN, 11/21/19 13:39:00 CST, [facility], Future Order   Problem List/Past Medical History  Ongoing  Atrial fibrillation  Bilateral carotid bruits  Chronic idiopathic urticaria  Coronary artery stenosis  Depression  Elevated cholesterol  Genital herpes simplex type 2  Glaucoma  History of transesophageal echocardiography ([facility])  HTN - Hypertension  Hypercalcemia  Hyperglycemia  Hypertension  Hypothyroid  Mitral valve  regurgitation  Pica  Polydipsia  RBBB  Tortuous artery  Type 2 diabetes mellitus  Urinary frequency  Historical  No qualifying data  Procedure/Surgical History  Mammogram (11/19/2017)  Cardioversion (.) (08/23/2017)  Cardioversion, elective, electrical conversion of arrhythmia; external (08/23/2017)  Echocardiography, transesophageal, real-time with image documentation (2D) (with or without M-mode recording); including probe placement, image acquisition, interpretation and report (08/23/2017)  Restoration of Cardiac Rhythm, Single (08/23/2017)  Transesophageal Echo (for Surgery) (.) (08/23/2017)  Ultrasonography of Right and Left Heart, Transesophageal (08/23/2017)  Colonoscopy (11/11/2011)  CT of heart without contrast with calcium scoring  Excision of squamous cell carcinoma  Hysterectomy  laser eye surgery for glaucoma   Medications  aspirin 81 mg oral Delayed Release (EC) tablet, 81 mg= 1 tab(s), Oral, Daily  Diflucan 150 mg oral tablet, See Instructions  flecainide 50 mg oral tablet, 50 mg= 1 tab(s), Oral, BID  hydrochlorothiazide 25 mg oral tablet, 25 mg= 1 tab(s), Oral, Daily  irbesartan 150 mg oral tablet, 150 mg= 1 tab(s), Oral, Daily  lovastatin 20 mg oral tablet, 20 mg= 1 tab(s), Oral, Daily  metoprolol tartrate 25 mg oral tab, 12.5 mg= 0.5 tab(s), Oral, BID  Premarin 0.625 mg/g vaginal cream with applicator, See Instructions  Xarelto 20mg Tablet, 20 mg= 1 tab(s), Oral, qPM  Allergies  No Known Medication Allergies  Social History  Abuse/Neglect  No, 11/21/2019  Alcohol  Never, 08/23/2017  Employment/School  Employed, 02/18/2019  Home/Environment  Lives with Alone., 02/18/2019  Substance Use  Never, 02/18/2019  Tobacco  Former smoker, quit more than 30 days ago, N/A, 11/21/2019  Family History  Alcoholism: Sister.  CAD - Coronary artery disease: Father.  Congestive heart failure: Father.  Lung cancer: Mother.  Schizophrenia: Sister.  Immunizations  Vaccine Date Status   influenza virus vaccine,  inactivated 12/01/2018 Recorded   pneumococcal 13-valent conjugate vaccine 07/18/2017 Recorded   pneumococcal 23-polyvalent vaccine 03/15/2016 Recorded   influenza virus vaccine, inactivated 11/22/2014 Recorded   influenza virus vaccine, inactivated 09/17/2011 Recorded   zoster vaccine live 07/28/2011 Recorded   pneumococcal 23-polyvalent vaccine 07/28/2011 Recorded   Health Maintenance  Health Maintenance  ???Pending?(in the next year)  ??? ??OverDue  ??? ? ? ?Coronary Artery Disease Maintenance-Lipid Lowering Therapy due??and every?  ??? ? ? ?Pneumococcal Vaccine due??and every?  ??? ? ? ?Aspirin Therapy for CVD Prevention due??08/22/18??and every 1??year(s)  ??? ? ? ?Advance Directive due??01/01/19??and every 1??year(s)  ??? ? ? ?Cognitive Screening due??01/01/19??and every 1??year(s)  ??? ? ? ?Fall Risk Assessment due??01/01/19??and every 1??year(s)  ??? ? ? ?Functional Assessment due??01/01/19??and every 1??year(s)  ??? ? ? ?Geriatric Depression Screening due??01/01/19??and every 1??year(s)  ??? ??Due?  ??? ? ? ?ADL Screening due??11/21/19??and every 1??year(s)  ??? ? ? ?Bone Density Screening due??11/21/19??Variable frequency  ??? ? ? ?Diabetes Maintenance-Eye Exam due??11/21/19??and every?  ??? ? ? ?Diabetes Maintenance-Foot Exam due??11/21/19??and every?  ??? ? ? ?Diabetes Maintenance-Medication Prescribed due??11/21/19??and every 1??year(s)  ??? ? ? ?Hypertension Management-Education due??11/21/19??and every 1??year(s)  ??? ? ? ?Tetanus Vaccine due??11/21/19??and every 10??year(s)  ??? ??Due In Future?  ??? ? ? ?Obesity Screening not due until??01/01/20??and every 1??year(s)  ??? ? ? ?Diabetes Maintenance-Fasting Lipid Profile not due until??02/18/20??and every 1??year(s)  ??? ? ? ?Diabetes Maintenance-Urine Dipstick not due until??02/18/20??and every 1??year(s)  ??? ? ? ?Diabetes Maintenance-HgbA1c not due until??11/20/20??and every 1??year(s)  ??? ? ? ?Hypertension Management-BMP not due  until??11/20/20??and every 1??year(s)  ??? ? ? ?Hypertension Management-Blood Pressure not due until??11/20/20??and every 1??year(s)  ???Satisfied?(in the past 1 year)  ??? ??Satisfied?  ??? ? ? ?Blood Pressure Screening on??11/21/19.??Satisfied by Regla Hatfield LPN  ??? ? ? ?Body Mass Index Check on??11/21/19.??Satisfied by Regla Hatfield LPN  ??? ? ? ?Coronary Artery Disease Maintenance-Lipid Lowering Therapy on??11/21/19.  ??? ? ? ?Diabetes Maintenance-Fasting Lipid Profile on??02/18/19.??Satisfied by Rafael Enriquez  ??? ? ? ?Diabetes Maintenance-HgbA1c on??11/21/19.??Satisfied by Rafael Enriquez  ??? ? ? ?Diabetes Maintenance-Serum Creatinine on??11/21/19.??Satisfied by Rafael Enriquez  ??? ? ? ?Diabetes Maintenance-Urine Dipstick on??02/18/19.??Satisfied by Neeru Sauceda  ??? ? ? ?Diabetes Screening on??11/21/19.??Satisfied by Rafael Enriquez  ??? ? ? ?Hypertension Management-BMP on??11/21/19.??Satisfied by Rafael Enriquez  ??? ? ? ?Hypertension Management-Blood Pressure on??11/21/19.??Satisfied by Regla Hatfield LPN  ??? ? ? ?Influenza Vaccine on??12/01/18.??Satisfied by Regla Hatfield LPN  ??? ? ? ?Lipid Screening on??02/18/19.??Satisfied by Rafael Enriquez  ??? ? ? ?Obesity Screening on??11/21/19.??Satisfied by Regla Hatfield LPN  ?

## 2022-05-02 NOTE — HISTORICAL [facility]
This is a historical note converted from Angy. Formatting and pictures may have been removed.  Please reference Angy for original formatting and attached multimedia. Chief Complaint  3 month recheck  History of Present Illness  had labs in July, A1c was 6.8, Vit D was 20 ,? LDL 82  did FLP/CMP yesterday with dr bhardwaj  still taking xarelto without any side effects of adverse reactions  got flu shot [facility]  still no episodes of atrial fibrillation recurrence  no cardiac/resp c/o  no recent urticaria episodes  home BPs have beeen good  Review of Systems  ?14 point Review of Systems performed with no exceptions for new complaints other than as noted in HPI.  Physical Exam  Vitals & Measurements  HR:?90(Peripheral)? BP:?126/84?  HT:?165?cm? WT:?101?kg?  ?  PHYSICAL EXAM  ?   WDWN patient in NAD, ?AFVSS  HEENT - no acute abnormality  ??????????????? oropharynx WNL  HEART - RRR  LUNGS -? CTA  ABDOMEN - benign, NTND;? no peritoneal signs  EXTREMITIES - trace PTE  SKIN - warm, dry, intact  PSYCH - affect appropriate;? alert and oriented  NEURO- no new deficits noted;? cranial nerves grossly intact  ?  Assessment/Plan  1.?Type 2 diabetes mellitus?E11.9  continue to keep carb intake down, ?recheck A1c  2.?HLD (hyperlipidemia)?E78.5  recheck FLP  3.?Vitamin D deficiency?E55.9  recheck level, probably needs lifelong supplementation  4.?Atrial fibrillation?I48.91  asymptomatic  5.?HTN - Hypertension?I10  good control  6.?Hypothyroid?E03.9  ?off meds, TSH has been ok  ?  7.?History of atrial fibrillation?Z86.79  still in NSR  has follow up with dr bhardwaj  Referrals  Clinic Follow up, *Est. 06/23/21 3:00:00 CDT, PLEASE MAKE THIS A 30 MINUTE CPX, Order for future visit, Type 2 diabetes mellitus, [facility] AFP  Clinic Follow up, Order for future visit   Problem List/Past Medical History  Ongoing  Atrial fibrillation  Bilateral carotid bruits  Chronic idiopathic urticaria  Coronary artery stenosis  Depression  Genital herpes  simplex type 2  Glaucoma  History of atrial fibrillation  History of transesophageal echocardiography ([facility])  HLD (hyperlipidemia)  HTN - Hypertension  Hypercalcemia  Hypothyroid  Mitral valve regurgitation  Parathyroid adenoma  Pica  Polydipsia  RBBB  Tortuous artery  Type 2 diabetes mellitus  Urinary frequency  Vertigo  Vitamin D deficiency  Historical  No qualifying data  Procedure/Surgical History  Mammogram (11/19/2017)  Cardioversion (.) (08/23/2017)  Cardioversion, elective, electrical conversion of arrhythmia; external (08/23/2017)  Echocardiography, transesophageal, real-time with image documentation (2D) (with or without M-mode recording); including probe placement, image acquisition, interpretation and report (08/23/2017)  Restoration of Cardiac Rhythm, Single (08/23/2017)  Transesophageal Echo (for Surgery) (.) (08/23/2017)  Ultrasonography of Right and Left Heart, Transesophageal (08/23/2017)  Colonoscopy (11/11/2011)  CT of heart without contrast with calcium scoring  Excision of squamous cell carcinoma  Hysterectomy  laser eye surgery for glaucoma   Medications  aspirin 81 mg oral Delayed Release (EC) tablet, 81 mg= 1 tab(s), Oral, Daily  ergocalciferol 50,000 intUnit oral capsule, 70056 IntUnit= 1 cap(s), Oral, qWeek  estradiol 1 mg oral tablet, 1 mg= 1 tab(s), Oral, Daily, 1 refills,? ?Not taking  flecainide 50 mg oral tablet, 50 mg= 1 tab(s), Oral, BID  hydrochlorothiazide 25 mg oral tablet, 25 mg= 1 tab(s), Oral, Daily  irbesartan 150 mg oral tablet, 150 mg= 1 tab(s), Oral, Daily  lovastatin 20 mg oral tablet, 20 mg= 1 tab(s), Oral, Daily  metoprolol tartrate 25 mg oral tab, 12.5 mg= 0.5 tab(s), Oral, BID  mometasone 0.1% topical cream, See Instructions  Xarelto 20mg Tablet, 20 mg= 1 tab(s), Oral, qPM  Allergies  No Known Medication Allergies  Social History  Abuse/Neglect  No, 12/23/2020  No, 09/23/2020  No, 07/14/2020  Alcohol  Never, 08/23/2017  Employment/School  Employed,  02/18/2019  Home/Environment  Lives with Alone., 02/18/2019  Substance Use  Never, 02/18/2019  Tobacco  Former smoker, quit more than 30 days ago, N/A, 12/23/2020  Former smoker, quit more than 30 days ago, N/A, 09/23/2020  Former smoker, quit more than 30 days ago, N/A, 07/14/2020  Family History  Alcoholism: Sister.  CAD - Coronary artery disease: Father.  Congestive heart failure: Father.  Lung cancer: Mother.  Schizophrenia: Sister.  Immunizations  Vaccine Date Status   influenza virus vaccine, inactivated 12/15/2019 Recorded   influenza virus vaccine, inactivated 12/01/2018 Recorded   pneumococcal 13-valent conjugate vaccine 07/18/2017 Recorded   pneumococcal 23-polyvalent vaccine 03/15/2016 Recorded   influenza virus vaccine, inactivated 11/22/2014 Recorded   influenza virus vaccine, inactivated 09/17/2011 Recorded   zoster vaccine live 07/28/2011 Recorded   pneumococcal 23-polyvalent vaccine 07/28/2011 Recorded   Health Maintenance  Health Maintenance  ???Pending?(in the next year)  ??? ??OverDue  ??? ? ? ?Advance Directive due??01/02/20??and every 1??year(s)  ??? ? ? ?Cognitive Screening due??01/02/20??and every 1??year(s)  ??? ? ? ?Fall Risk Assessment due??01/02/20??and every 1??year(s)  ??? ? ? ?Functional Assessment due??01/02/20??and every 1??year(s)  ??? ? ? ?Influenza Vaccine due??10/01/20??and every 1??day(s)  ??? ??Due?  ??? ? ? ?ADL Screening due??12/26/20??and every 1??year(s)  ??? ? ? ?Bone Density Screening due??12/26/20??Variable frequency  ??? ? ? ?Depression Screening due??12/26/20??Unknown Frequency  ??? ? ? ?Diabetes Maintenance-Eye Exam due??12/26/20??Unknown Frequency  ??? ? ? ?Diabetes Maintenance-Foot Exam due??12/26/20??Unknown Frequency  ??? ? ? ?Diabetes Maintenance-Medication Prescribed due??12/26/20??and every 1??year(s)  ??? ? ? ?Hypertension Management-Education due??12/26/20??and every 1??year(s)  ??? ? ? ?Medicare Annual Wellness Exam due??12/26/20??and every 1??year(s)  ??? ?  ? ?Tetanus Vaccine due??12/26/20??and every 10??year(s)  ??? ? ? ?Zoster Vaccine due??12/26/20??Unknown Frequency  ??? ??Due In Future?  ??? ? ? ?Obesity Screening not due until??01/01/21??and every 1??year(s)  ??? ? ? ?Hypertension Management-BMP not due until??07/10/21??and every 1??year(s)  ??? ? ? ?Diabetes Maintenance-Fasting Lipid Profile not due until??07/10/21??and every 1??year(s)  ??? ? ? ?Diabetes Maintenance-Serum Creatinine not due until??07/10/21??and every 1??year(s)  ??? ? ? ?Body Mass Index Check not due until??07/14/21??and every 1??year(s)  ??? ? ? ?Blood Pressure Screening not due until??12/23/21??and every 1??year(s)  ??? ? ? ?Diabetes Maintenance-HgbA1c not due until??12/23/21??and every 1??year(s)  ??? ? ? ?Hypertension Management-Blood Pressure not due until??12/23/21??and every 1??year(s)  ???Satisfied?(in the past 1 year)  ??? ??Satisfied?  ??? ? ? ?Blood Pressure Screening on??12/23/20.??Satisfied by Poppy Maxwell MA  ??? ? ? ?Body Mass Index Check on??07/14/20.??Satisfied by Regla Hatfield LPN  ??? ? ? ?Breast Cancer Screening on??11/17/20.??Satisfied by Nimo Carvajal  ??? ? ? ?Diabetes Maintenance-Fasting Lipid Profile on??07/10/20.??Satisfied by Rafael Enriquez  ??? ? ? ?Diabetes Maintenance-HgbA1c on??12/23/20.??Satisfied by Barbara Velazco  ??? ? ? ?Diabetes Maintenance-Serum Creatinine on??07/10/20.??Satisfied by Rafael Enriquez  ??? ? ? ?Diabetes Screening on??12/23/20.??Satisfied by Barbara Velazco  ??? ? ? ?Hypertension Management-Blood Pressure on??12/23/20.??Satisfied by Poppy Maxwell MA  ??? ? ? ?Hypertension Management-BMP on??07/10/20.??Satisfied by Rafael Enriquez  ??? ? ? ?Lipid Screening on??07/10/20.??Satisfied by Rafael Enriquez  ??? ? ? ?Obesity Screening on??07/14/20.??Satisfied by Regla Hatfield LPN  ?

## 2022-07-29 DIAGNOSIS — E11.9 DIABETES MELLITUS WITHOUT COMPLICATION: Primary | ICD-10-CM

## 2022-08-01 NOTE — TELEPHONE ENCOUNTER
Please research these refills for appropriate action-  I don't see an upcoming appointment so may need to be scheduled before calling in refills  Thank you !

## 2022-08-03 RX ORDER — METFORMIN HYDROCHLORIDE 500 MG/1
TABLET, EXTENDED RELEASE ORAL
Qty: 30 TABLET | Refills: 1 | Status: SHIPPED | OUTPATIENT
Start: 2022-08-03 | End: 2022-08-26

## 2022-08-08 PROBLEM — H40.9 GLAUCOMA: Status: ACTIVE | Noted: 2022-08-08

## 2022-08-08 PROBLEM — R09.89 CAROTID BRUIT: Status: ACTIVE | Noted: 2022-08-08

## 2022-08-08 PROBLEM — L50.1 CHRONIC IDIOPATHIC URTICARIA: Status: ACTIVE | Noted: 2022-08-08

## 2022-08-08 PROBLEM — R20.2 PARESTHESIAS: Status: ACTIVE | Noted: 2022-08-08

## 2022-08-08 PROBLEM — E78.5 HYPERLIPIDEMIA: Status: ACTIVE | Noted: 2022-08-08

## 2022-08-08 PROBLEM — I45.10 RIGHT BUNDLE BRANCH BLOCK: Status: ACTIVE | Noted: 2022-08-08

## 2022-08-08 PROBLEM — F32.A DEPRESSIVE DISORDER: Status: ACTIVE | Noted: 2022-08-08

## 2022-08-08 PROBLEM — R42 DIZZINESS: Status: ACTIVE | Noted: 2022-08-08

## 2022-08-08 PROBLEM — I10 HYPERTENSION: Status: ACTIVE | Noted: 2022-08-08

## 2022-08-08 PROBLEM — E03.9 HYPOTHYROIDISM: Status: ACTIVE | Noted: 2022-08-08

## 2022-08-08 PROBLEM — M10.9 GOUTY ARTHRITIS: Status: ACTIVE | Noted: 2022-08-08

## 2022-08-08 PROBLEM — E83.52 HYPERCALCEMIA: Status: ACTIVE | Noted: 2022-08-08

## 2022-08-08 PROBLEM — Z86.79 HISTORY OF ATRIAL FIBRILLATION: Status: ACTIVE | Noted: 2022-08-08

## 2022-08-08 PROBLEM — R55 SYNCOPE: Status: ACTIVE | Noted: 2022-08-08

## 2022-08-08 PROBLEM — Z79.01 ANTICOAGULATED BY ANTICOAGULATION TREATMENT: Status: ACTIVE | Noted: 2022-08-08

## 2022-08-08 PROBLEM — A60.00 GENITAL HERPES SIMPLEX TYPE 2: Status: ACTIVE | Noted: 2022-08-08

## 2022-08-08 PROBLEM — Z00.00 ENCOUNTER FOR ANNUAL WELLNESS EXAM IN MEDICARE PATIENT: Status: ACTIVE | Noted: 2022-08-08

## 2022-08-08 PROBLEM — I34.0 MITRAL VALVE INSUFFICIENCY: Status: ACTIVE | Noted: 2022-08-08

## 2022-08-08 PROBLEM — E11.9 TYPE 2 DIABETES MELLITUS: Status: ACTIVE | Noted: 2022-08-08

## 2022-08-08 PROBLEM — I25.10 CORONARY ARTERY STENOSIS: Status: ACTIVE | Noted: 2022-08-08

## 2022-09-27 PROBLEM — D35.1 PARATHYROID ADENOMA: Status: ACTIVE | Noted: 2022-09-27

## 2022-09-27 PROBLEM — F50.89 PICA: Status: ACTIVE | Noted: 2022-09-27

## 2022-09-27 PROBLEM — I48.0 PAROXYSMAL ATRIAL FIBRILLATION: Status: ACTIVE | Noted: 2022-09-06

## 2022-09-27 PROBLEM — E55.9 VITAMIN D DEFICIENCY: Status: ACTIVE | Noted: 2022-09-27

## 2022-09-27 PROBLEM — I77.9 ARTERIAL DISEASE: Status: ACTIVE | Noted: 2022-09-27

## 2022-09-27 PROBLEM — R63.1 EXCESSIVE THIRST: Status: ACTIVE | Noted: 2022-09-27

## 2022-09-27 PROBLEM — R35.0 INCREASED FREQUENCY OF URINATION: Status: ACTIVE | Noted: 2022-09-27

## 2022-11-07 PROBLEM — Z00.00 ENCOUNTER FOR ANNUAL WELLNESS EXAM IN MEDICARE PATIENT: Status: RESOLVED | Noted: 2022-08-08 | Resolved: 2022-11-07

## 2023-04-05 PROBLEM — I50.9 ACUTE ON CHRONIC CONGESTIVE HEART FAILURE, UNSPECIFIED HEART FAILURE TYPE: Status: ACTIVE | Noted: 2023-04-05

## 2023-04-05 PROBLEM — E66.01 SEVERE OBESITY (BMI 35.0-39.9) WITH COMORBIDITY: Status: ACTIVE | Noted: 2023-04-05

## 2023-04-05 PROBLEM — J44.9 CHRONIC OBSTRUCTIVE PULMONARY DISEASE, UNSPECIFIED COPD TYPE: Status: ACTIVE | Noted: 2023-04-05

## 2023-04-05 PROBLEM — N18.32 STAGE 3B CHRONIC KIDNEY DISEASE: Status: ACTIVE | Noted: 2023-04-05

## 2023-06-07 DIAGNOSIS — E11.9 DIABETES MELLITUS WITHOUT COMPLICATION: ICD-10-CM

## 2023-06-07 RX ORDER — METFORMIN HYDROCHLORIDE 500 MG/1
TABLET, EXTENDED RELEASE ORAL
Qty: 90 TABLET | Refills: 1 | Status: SHIPPED | OUTPATIENT
Start: 2023-06-07 | End: 2023-09-27

## 2023-09-26 DIAGNOSIS — E11.9 DIABETES MELLITUS WITHOUT COMPLICATION: ICD-10-CM

## 2023-09-27 RX ORDER — METFORMIN HYDROCHLORIDE 500 MG/1
TABLET, EXTENDED RELEASE ORAL
Qty: 90 TABLET | Refills: 1 | Status: SHIPPED | OUTPATIENT
Start: 2023-09-27

## 2024-02-09 PROBLEM — I50.9 ACUTE ON CHRONIC CONGESTIVE HEART FAILURE, UNSPECIFIED HEART FAILURE TYPE: Status: RESOLVED | Noted: 2023-04-05 | Resolved: 2024-02-09

## 2024-02-09 PROBLEM — I77.9 ARTERIAL DISEASE: Status: RESOLVED | Noted: 2022-09-27 | Resolved: 2024-02-09

## 2024-02-09 PROBLEM — Z79.899 HIGH RISK MEDICATION USE: Status: ACTIVE | Noted: 2024-02-09

## 2024-03-25 PROBLEM — E11.65 INADEQUATELY CONTROLLED DIABETES MELLITUS: Status: ACTIVE | Noted: 2024-03-25

## 2024-03-25 PROBLEM — J45.909 ASTHMA: Status: ACTIVE | Noted: 2024-03-25

## 2024-03-26 PROCEDURE — 85379 FIBRIN DEGRADATION QUANT: CPT | Performed by: FAMILY MEDICINE

## 2024-03-26 PROCEDURE — 83880 ASSAY OF NATRIURETIC PEPTIDE: CPT | Performed by: FAMILY MEDICINE

## 2024-06-06 DIAGNOSIS — E11.9 DIABETES MELLITUS WITHOUT COMPLICATION: ICD-10-CM

## 2024-06-06 RX ORDER — METFORMIN HYDROCHLORIDE 500 MG/1
TABLET, EXTENDED RELEASE ORAL
Qty: 90 TABLET | Refills: 1 | Status: SHIPPED | OUTPATIENT
Start: 2024-06-06

## 2024-07-08 ENCOUNTER — HOSPITAL ENCOUNTER (INPATIENT)
Facility: HOSPITAL | Age: 84
LOS: 2 days | Discharge: HOME HEALTH | DRG: 291 | End: 2024-07-10
Attending: STUDENT IN AN ORGANIZED HEALTH CARE EDUCATION/TRAINING PROGRAM | Admitting: INTERNAL MEDICINE
Payer: MEDICARE

## 2024-07-08 DIAGNOSIS — I50.9 CONGESTIVE HEART FAILURE, UNSPECIFIED HF CHRONICITY, UNSPECIFIED HEART FAILURE TYPE: Primary | ICD-10-CM

## 2024-07-08 DIAGNOSIS — R06.02 SOB (SHORTNESS OF BREATH): ICD-10-CM

## 2024-07-08 DIAGNOSIS — R06.02 SHORTNESS OF BREATH: ICD-10-CM

## 2024-07-08 LAB
ALBUMIN SERPL-MCNC: 3.6 G/DL (ref 3.4–4.8)
ALBUMIN/GLOB SERPL: 0.9 RATIO (ref 1.1–2)
ALP SERPL-CCNC: 78 UNIT/L (ref 40–150)
ALT SERPL-CCNC: 12 UNIT/L (ref 0–55)
ANION GAP SERPL CALC-SCNC: 10 MEQ/L
APTT PPP: 31.9 SECONDS (ref 23.2–33.7)
AST SERPL-CCNC: 19 UNIT/L (ref 5–34)
BASOPHILS # BLD AUTO: 0.03 X10(3)/MCL
BASOPHILS NFR BLD AUTO: 0.4 %
BILIRUB SERPL-MCNC: 0.5 MG/DL
BNP BLD-MCNC: 194.2 PG/ML
BUN SERPL-MCNC: 23.4 MG/DL (ref 9.8–20.1)
CALCIUM SERPL-MCNC: 10 MG/DL (ref 8.4–10.2)
CHLORIDE SERPL-SCNC: 101 MMOL/L (ref 98–107)
CO2 SERPL-SCNC: 30 MMOL/L (ref 23–31)
CREAT SERPL-MCNC: 1.25 MG/DL (ref 0.55–1.02)
CREAT/UREA NIT SERPL: 19
D DIMER PPP IA.FEU-MCNC: 2.02 UG/ML FEU (ref 0–0.5)
EOSINOPHIL # BLD AUTO: 0.07 X10(3)/MCL (ref 0–0.9)
EOSINOPHIL NFR BLD AUTO: 1 %
ERYTHROCYTE [DISTWIDTH] IN BLOOD BY AUTOMATED COUNT: 16.5 % (ref 11.5–17)
FLUAV AG UPPER RESP QL IA.RAPID: NOT DETECTED
FLUBV AG UPPER RESP QL IA.RAPID: NOT DETECTED
GFR SERPLBLD CREATININE-BSD FMLA CKD-EPI: 43 ML/MIN/1.73/M2
GLOBULIN SER-MCNC: 4.1 GM/DL (ref 2.4–3.5)
GLUCOSE SERPL-MCNC: 188 MG/DL (ref 82–115)
HCT VFR BLD AUTO: 38.7 % (ref 37–47)
HGB BLD-MCNC: 11.5 G/DL (ref 12–16)
IMM GRANULOCYTES # BLD AUTO: 0.02 X10(3)/MCL (ref 0–0.04)
IMM GRANULOCYTES NFR BLD AUTO: 0.3 %
INR PPP: 1.4
LYMPHOCYTES # BLD AUTO: 0.97 X10(3)/MCL (ref 0.6–4.6)
LYMPHOCYTES NFR BLD AUTO: 14 %
MCH RBC QN AUTO: 24.1 PG (ref 27–31)
MCHC RBC AUTO-ENTMCNC: 29.7 G/DL (ref 33–36)
MCV RBC AUTO: 81 FL (ref 80–94)
MONOCYTES # BLD AUTO: 0.57 X10(3)/MCL (ref 0.1–1.3)
MONOCYTES NFR BLD AUTO: 8.2 %
NEUTROPHILS # BLD AUTO: 5.25 X10(3)/MCL (ref 2.1–9.2)
NEUTROPHILS NFR BLD AUTO: 76.1 %
NRBC BLD AUTO-RTO: 0 %
PLATELET # BLD AUTO: 408 X10(3)/MCL (ref 130–400)
PMV BLD AUTO: 10.8 FL (ref 7.4–10.4)
POTASSIUM SERPL-SCNC: 3.3 MMOL/L (ref 3.5–5.1)
PROT SERPL-MCNC: 7.7 GM/DL (ref 5.8–7.6)
PROTHROMBIN TIME: 17 SECONDS (ref 12.5–14.5)
RBC # BLD AUTO: 4.78 X10(6)/MCL (ref 4.2–5.4)
SARS-COV-2 RNA RESP QL NAA+PROBE: NOT DETECTED
SODIUM SERPL-SCNC: 141 MMOL/L (ref 136–145)
TROPONIN I SERPL-MCNC: <0.01 NG/ML (ref 0–0.04)
WBC # BLD AUTO: 6.91 X10(3)/MCL (ref 4.5–11.5)

## 2024-07-08 PROCEDURE — 25000003 PHARM REV CODE 250: Performed by: INTERNAL MEDICINE

## 2024-07-08 PROCEDURE — 63600175 PHARM REV CODE 636 W HCPCS: Performed by: STUDENT IN AN ORGANIZED HEALTH CARE EDUCATION/TRAINING PROGRAM

## 2024-07-08 PROCEDURE — 85730 THROMBOPLASTIN TIME PARTIAL: CPT | Performed by: PHYSICIAN ASSISTANT

## 2024-07-08 PROCEDURE — 99285 EMERGENCY DEPT VISIT HI MDM: CPT | Mod: 25

## 2024-07-08 PROCEDURE — 0240U COVID/FLU A&B PCR: CPT | Performed by: PHYSICIAN ASSISTANT

## 2024-07-08 PROCEDURE — 85025 COMPLETE CBC W/AUTO DIFF WBC: CPT | Performed by: PHYSICIAN ASSISTANT

## 2024-07-08 PROCEDURE — 93010 ELECTROCARDIOGRAM REPORT: CPT | Mod: ,,, | Performed by: INTERNAL MEDICINE

## 2024-07-08 PROCEDURE — 85610 PROTHROMBIN TIME: CPT | Performed by: PHYSICIAN ASSISTANT

## 2024-07-08 PROCEDURE — 25000003 PHARM REV CODE 250: Performed by: PHYSICIAN ASSISTANT

## 2024-07-08 PROCEDURE — 83880 ASSAY OF NATRIURETIC PEPTIDE: CPT | Performed by: PHYSICIAN ASSISTANT

## 2024-07-08 PROCEDURE — 93005 ELECTROCARDIOGRAM TRACING: CPT

## 2024-07-08 PROCEDURE — 84484 ASSAY OF TROPONIN QUANT: CPT | Performed by: PHYSICIAN ASSISTANT

## 2024-07-08 PROCEDURE — 11000001 HC ACUTE MED/SURG PRIVATE ROOM

## 2024-07-08 PROCEDURE — 80053 COMPREHEN METABOLIC PANEL: CPT | Performed by: PHYSICIAN ASSISTANT

## 2024-07-08 PROCEDURE — 96374 THER/PROPH/DIAG INJ IV PUSH: CPT

## 2024-07-08 PROCEDURE — 85379 FIBRIN DEGRADATION QUANT: CPT | Performed by: INTERNAL MEDICINE

## 2024-07-08 PROCEDURE — 25000003 PHARM REV CODE 250: Performed by: STUDENT IN AN ORGANIZED HEALTH CARE EDUCATION/TRAINING PROGRAM

## 2024-07-08 RX ORDER — ACETAMINOPHEN 325 MG/1
650 TABLET ORAL EVERY 8 HOURS PRN
Status: DISCONTINUED | OUTPATIENT
Start: 2024-07-08 | End: 2024-07-10 | Stop reason: HOSPADM

## 2024-07-08 RX ORDER — IBUPROFEN 200 MG
16 TABLET ORAL
Status: DISCONTINUED | OUTPATIENT
Start: 2024-07-08 | End: 2024-07-10 | Stop reason: HOSPADM

## 2024-07-08 RX ORDER — SODIUM CHLORIDE 0.9 % (FLUSH) 0.9 %
10 SYRINGE (ML) INJECTION
Status: DISCONTINUED | OUTPATIENT
Start: 2024-07-08 | End: 2024-07-10 | Stop reason: HOSPADM

## 2024-07-08 RX ORDER — HYDRALAZINE HYDROCHLORIDE 20 MG/ML
20 INJECTION INTRAMUSCULAR; INTRAVENOUS
Status: COMPLETED | OUTPATIENT
Start: 2024-07-08 | End: 2024-07-08

## 2024-07-08 RX ORDER — FUROSEMIDE 10 MG/ML
40 INJECTION INTRAMUSCULAR; INTRAVENOUS
Status: DISCONTINUED | OUTPATIENT
Start: 2024-07-08 | End: 2024-07-08

## 2024-07-08 RX ORDER — GLUCAGON 1 MG
1 KIT INJECTION
Status: DISCONTINUED | OUTPATIENT
Start: 2024-07-08 | End: 2024-07-10 | Stop reason: HOSPADM

## 2024-07-08 RX ORDER — INSULIN ASPART 100 [IU]/ML
0-10 INJECTION, SOLUTION INTRAVENOUS; SUBCUTANEOUS
Status: DISCONTINUED | OUTPATIENT
Start: 2024-07-08 | End: 2024-07-10 | Stop reason: HOSPADM

## 2024-07-08 RX ORDER — IBUPROFEN 200 MG
24 TABLET ORAL
Status: DISCONTINUED | OUTPATIENT
Start: 2024-07-08 | End: 2024-07-10 | Stop reason: HOSPADM

## 2024-07-08 RX ORDER — ACETAMINOPHEN 10 MG/ML
1000 INJECTION, SOLUTION INTRAVENOUS ONCE
Status: COMPLETED | OUTPATIENT
Start: 2024-07-08 | End: 2024-07-08

## 2024-07-08 RX ORDER — TALC
6 POWDER (GRAM) TOPICAL NIGHTLY PRN
Status: DISCONTINUED | OUTPATIENT
Start: 2024-07-08 | End: 2024-07-10 | Stop reason: HOSPADM

## 2024-07-08 RX ORDER — CLONIDINE HYDROCHLORIDE 0.1 MG/1
0.1 TABLET ORAL 3 TIMES DAILY PRN
Status: DISCONTINUED | OUTPATIENT
Start: 2024-07-08 | End: 2024-07-10 | Stop reason: HOSPADM

## 2024-07-08 RX ORDER — FUROSEMIDE 10 MG/ML
40 INJECTION INTRAMUSCULAR; INTRAVENOUS
Status: COMPLETED | OUTPATIENT
Start: 2024-07-08 | End: 2024-07-08

## 2024-07-08 RX ADMIN — FUROSEMIDE 40 MG: 10 INJECTION, SOLUTION INTRAMUSCULAR; INTRAVENOUS at 09:07

## 2024-07-08 RX ADMIN — CLONIDINE HYDROCHLORIDE 0.1 MG: 0.1 TABLET ORAL at 10:07

## 2024-07-08 RX ADMIN — ACETAMINOPHEN 1000 MG: 10 INJECTION, SOLUTION INTRAVENOUS at 09:07

## 2024-07-08 RX ADMIN — HYDRALAZINE HYDROCHLORIDE 20 MG: 20 INJECTION INTRAMUSCULAR; INTRAVENOUS at 08:07

## 2024-07-08 RX ADMIN — SODIUM CHLORIDE 1000 ML: 9 INJECTION, SOLUTION INTRAVENOUS at 03:07

## 2024-07-08 RX ADMIN — POTASSIUM BICARBONATE 60 MEQ: 391 TABLET, EFFERVESCENT ORAL at 09:07

## 2024-07-08 NOTE — FIRST PROVIDER EVALUATION
"Medical screening examination initiated.  I have conducted a focused provider triage encounter, findings are as follows:    Brief history of present illness:  83-year-old female presents to ED for evaluation of shortness of breath on exertion.  Reports shortness of breath going on for the last 4 weeks.  Seen at her PCP prior to arrival with near syncopal episode heart rate of 120s.  Sent to ED for further evaluation possible unstable atrial fibrillation    Vitals:    07/08/24 1521   BP: (!) 195/79   Pulse: 89   Resp: (!) 22   Temp: 97.5 °F (36.4 °C)   TempSrc: Oral   SpO2: (!) 94%   Weight: 90.7 kg (200 lb)   Height: 5' 6" (1.676 m)       Pertinent physical exam:  Patient awake and alert sitting in wheelchair.     Brief workup plan:  labs, EKG, CXR, IVF    Preliminary workup initiated; this workup will be continued and followed by the physician or advanced practice provider that is assigned to the patient when roomed.  "

## 2024-07-09 LAB
ALBUMIN SERPL-MCNC: 3 G/DL (ref 3.4–4.8)
ALBUMIN/GLOB SERPL: 0.9 RATIO (ref 1.1–2)
ALP SERPL-CCNC: 66 UNIT/L (ref 40–150)
ALT SERPL-CCNC: 11 UNIT/L (ref 0–55)
ANION GAP SERPL CALC-SCNC: 11 MEQ/L
AST SERPL-CCNC: 20 UNIT/L (ref 5–34)
BASOPHILS # BLD AUTO: 0.03 X10(3)/MCL
BASOPHILS NFR BLD AUTO: 0.4 %
BILIRUB SERPL-MCNC: 0.5 MG/DL
BUN SERPL-MCNC: 17.7 MG/DL (ref 9.8–20.1)
CALCIUM SERPL-MCNC: 9.2 MG/DL (ref 8.4–10.2)
CHLORIDE SERPL-SCNC: 102 MMOL/L (ref 98–107)
CO2 SERPL-SCNC: 29 MMOL/L (ref 23–31)
CREAT SERPL-MCNC: 1.08 MG/DL (ref 0.55–1.02)
CREAT/UREA NIT SERPL: 16
EOSINOPHIL # BLD AUTO: 0.03 X10(3)/MCL (ref 0–0.9)
EOSINOPHIL NFR BLD AUTO: 0.4 %
ERYTHROCYTE [DISTWIDTH] IN BLOOD BY AUTOMATED COUNT: 16.5 % (ref 11.5–17)
GFR SERPLBLD CREATININE-BSD FMLA CKD-EPI: 51 ML/MIN/1.73/M2
GLOBULIN SER-MCNC: 3.4 GM/DL (ref 2.4–3.5)
GLUCOSE SERPL-MCNC: 282 MG/DL (ref 82–115)
HCT VFR BLD AUTO: 36.2 % (ref 37–47)
HGB BLD-MCNC: 10.9 G/DL (ref 12–16)
IMM GRANULOCYTES # BLD AUTO: 0.03 X10(3)/MCL (ref 0–0.04)
IMM GRANULOCYTES NFR BLD AUTO: 0.4 %
LYMPHOCYTES # BLD AUTO: 0.65 X10(3)/MCL (ref 0.6–4.6)
LYMPHOCYTES NFR BLD AUTO: 8.7 %
MAGNESIUM SERPL-MCNC: 1.7 MG/DL (ref 1.6–2.6)
MCH RBC QN AUTO: 24.3 PG (ref 27–31)
MCHC RBC AUTO-ENTMCNC: 30.1 G/DL (ref 33–36)
MCV RBC AUTO: 80.8 FL (ref 80–94)
MONOCYTES # BLD AUTO: 0.72 X10(3)/MCL (ref 0.1–1.3)
MONOCYTES NFR BLD AUTO: 9.7 %
NEUTROPHILS # BLD AUTO: 5.99 X10(3)/MCL (ref 2.1–9.2)
NEUTROPHILS NFR BLD AUTO: 80.4 %
NRBC BLD AUTO-RTO: 0 %
OHS QRS DURATION: 160 MS
OHS QTC CALCULATION: 503 MS
PLATELET # BLD AUTO: 335 X10(3)/MCL (ref 130–400)
PMV BLD AUTO: 10.6 FL (ref 7.4–10.4)
POCT GLUCOSE: 171 MG/DL (ref 70–110)
POCT GLUCOSE: 212 MG/DL (ref 70–110)
POTASSIUM SERPL-SCNC: 3.3 MMOL/L (ref 3.5–5.1)
PROT SERPL-MCNC: 6.4 GM/DL (ref 5.8–7.6)
RBC # BLD AUTO: 4.48 X10(6)/MCL (ref 4.2–5.4)
SODIUM SERPL-SCNC: 142 MMOL/L (ref 136–145)
WBC # BLD AUTO: 7.45 X10(3)/MCL (ref 4.5–11.5)

## 2024-07-09 PROCEDURE — 36415 COLL VENOUS BLD VENIPUNCTURE: CPT | Performed by: INTERNAL MEDICINE

## 2024-07-09 PROCEDURE — 85025 COMPLETE CBC W/AUTO DIFF WBC: CPT | Performed by: INTERNAL MEDICINE

## 2024-07-09 PROCEDURE — 25000003 PHARM REV CODE 250: Performed by: INTERNAL MEDICINE

## 2024-07-09 PROCEDURE — 80053 COMPREHEN METABOLIC PANEL: CPT | Performed by: INTERNAL MEDICINE

## 2024-07-09 PROCEDURE — 25000242 PHARM REV CODE 250 ALT 637 W/ HCPCS: Performed by: INTERNAL MEDICINE

## 2024-07-09 PROCEDURE — 21400001 HC TELEMETRY ROOM

## 2024-07-09 PROCEDURE — 83735 ASSAY OF MAGNESIUM: CPT | Performed by: INTERNAL MEDICINE

## 2024-07-09 PROCEDURE — 63600175 PHARM REV CODE 636 W HCPCS: Performed by: INTERNAL MEDICINE

## 2024-07-09 RX ORDER — FUROSEMIDE 10 MG/ML
40 INJECTION INTRAMUSCULAR; INTRAVENOUS DAILY
Status: DISCONTINUED | OUTPATIENT
Start: 2024-07-09 | End: 2024-07-10 | Stop reason: HOSPADM

## 2024-07-09 RX ORDER — ATORVASTATIN CALCIUM 10 MG/1
20 TABLET, FILM COATED ORAL NIGHTLY
Status: DISCONTINUED | OUTPATIENT
Start: 2024-07-09 | End: 2024-07-10 | Stop reason: HOSPADM

## 2024-07-09 RX ORDER — METOPROLOL SUCCINATE 25 MG/1
25 TABLET, EXTENDED RELEASE ORAL DAILY
Status: DISCONTINUED | OUTPATIENT
Start: 2024-07-09 | End: 2024-07-10 | Stop reason: HOSPADM

## 2024-07-09 RX ORDER — CLONAZEPAM 0.5 MG/1
0.5 TABLET ORAL DAILY PRN
Status: DISCONTINUED | OUTPATIENT
Start: 2024-07-09 | End: 2024-07-10 | Stop reason: HOSPADM

## 2024-07-09 RX ORDER — FLUTICASONE FUROATE AND VILANTEROL 200; 25 UG/1; UG/1
1 POWDER RESPIRATORY (INHALATION) DAILY
Status: DISCONTINUED | OUTPATIENT
Start: 2024-07-09 | End: 2024-07-10 | Stop reason: HOSPADM

## 2024-07-09 RX ORDER — FLECAINIDE ACETATE 50 MG/1
50 TABLET ORAL DAILY
Status: DISCONTINUED | OUTPATIENT
Start: 2024-07-09 | End: 2024-07-10 | Stop reason: HOSPADM

## 2024-07-09 RX ORDER — METFORMIN HYDROCHLORIDE 500 MG/1
500 TABLET ORAL 2 TIMES DAILY WITH MEALS
Status: DISCONTINUED | OUTPATIENT
Start: 2024-07-09 | End: 2024-07-10 | Stop reason: HOSPADM

## 2024-07-09 RX ORDER — LATANOPROST 50 UG/ML
1 SOLUTION/ DROPS OPHTHALMIC NIGHTLY
Status: DISCONTINUED | OUTPATIENT
Start: 2024-07-09 | End: 2024-07-10 | Stop reason: HOSPADM

## 2024-07-09 RX ADMIN — CLONIDINE HYDROCHLORIDE 0.1 MG: 0.1 TABLET ORAL at 11:07

## 2024-07-09 RX ADMIN — POTASSIUM BICARBONATE 40 MEQ: 391 TABLET, EFFERVESCENT ORAL at 11:07

## 2024-07-09 RX ADMIN — LATANOPROST 1 DROP: 50 SOLUTION OPHTHALMIC at 08:07

## 2024-07-09 RX ADMIN — ATORVASTATIN CALCIUM 20 MG: 10 TABLET, FILM COATED ORAL at 08:07

## 2024-07-09 RX ADMIN — FLUTICASONE FUROATE AND VILANTEROL TRIFENATATE 1 PUFF: 200; 25 POWDER RESPIRATORY (INHALATION) at 10:07

## 2024-07-09 RX ADMIN — CLONIDINE HYDROCHLORIDE 0.1 MG: 0.1 TABLET ORAL at 04:07

## 2024-07-09 RX ADMIN — FUROSEMIDE 40 MG: 10 INJECTION, SOLUTION INTRAMUSCULAR; INTRAVENOUS at 08:07

## 2024-07-09 RX ADMIN — FLECAINIDE ACETATE 50 MG: 50 TABLET ORAL at 10:07

## 2024-07-09 RX ADMIN — RIVAROXABAN 20 MG: 10 TABLET, FILM COATED ORAL at 08:07

## 2024-07-09 RX ADMIN — INSULIN ASPART 4 UNITS: 100 INJECTION, SOLUTION INTRAVENOUS; SUBCUTANEOUS at 05:07

## 2024-07-09 RX ADMIN — POTASSIUM BICARBONATE 40 MEQ: 391 TABLET, EFFERVESCENT ORAL at 10:07

## 2024-07-09 RX ADMIN — METFORMIN HYDROCHLORIDE 500 MG: 500 TABLET, FILM COATED ORAL at 10:07

## 2024-07-09 RX ADMIN — METOPROLOL SUCCINATE 25 MG: 25 TABLET, EXTENDED RELEASE ORAL at 10:07

## 2024-07-09 RX ADMIN — METFORMIN HYDROCHLORIDE 500 MG: 500 TABLET, FILM COATED ORAL at 04:07

## 2024-07-09 NOTE — PLAN OF CARE
07/09/24 1106   Discharge Assessment   Assessment Type Discharge Planning Assessment   Confirmed/corrected address, phone number and insurance Yes   Source of Information patient   When was your last doctors appointment?   (PCP is Dr. Júnior Villalobos.)   Reason For Admission SOB   People in Home sibling(s)   Do you expect to return to your current living situation? Yes   Do you have help at home or someone to help you manage your care at home? Yes   Who are your caregiver(s) and their phone number(s)? Val/Sister/[phone]   Current cognitive status: Alert/Oriented   Walking or Climbing Stairs Difficulty no   Dressing/Bathing Difficulty no   Home Accessibility wheelchair accessible   Home Layout Able to live on 1st floor   Equipment Currently Used at Home walker, rolling;wheelchair   Readmission within 30 days? No   Patient currently being followed by outpatient case management? No   Do you currently have service(s) that help you manage your care at home? No   Do you take prescription medications? Yes   Do you have prescription coverage? Yes   Do you have any problems affording any of your prescribed medications? No   Who is going to help you get home at discharge? Sister   How do you get to doctors appointments? car, drives self   Discharge Plan A Home   Discharge Plan B Home   Discharge Plan discussed with: Patient   Housing Stability   In the last 12 months, was there a time when you were not able to pay the mortgage or rent on time? N   At any time in the past 12 months, were you homeless or living in a shelter (including now)? N   Transportation Needs   Has the lack of transportation kept you from medical appointments, meetings, work or from getting things needed for daily living? No   Food Insecurity   Within the past 12 months, you worried that your food would run out before you got the money to buy more. Never true   Within the past 12 months, the food you bought just didn't last and you didn't have  money to get more. Never true   Utilities   In the past 12 months has the electric, gas, oil, or water company threatened to shut off services in your home? No

## 2024-07-09 NOTE — PROGRESS NOTES
[facility]  Hospital Medicine Progress Note        CHIEF COMPLAINT   Shortness of Breath (Pt c/o SOB x 4 weeks, sent by Dr. Villalobos for unstable afib . Denies chest pain. O2 94% on RA)     HISTORY OF PRESENT ILLNESS:   Patient is an 83-year-old female with multiple comorbidities as listed below including HTN, DM2, gout, asthma, CAD, AFib on Xarelto, who presented to the ER with complaints of 4 weeks worsening dyspnea.  She saw her PCP today who who noted with ambulation she was having presyncopal episodes and tachycardia along with dyspnea and therefore referred her to the ER for further evaluation.       She arrived to the ER afebrile, significantly hypertensive, maintaining adequate sats on room air.  EKG showed atrial fibrillation.  Laboratory work was significant for a BNP of 190.  Chest x-ray showed bibasilar opacities with small effusions.  She was given IV Lasix and IV antihypertensives admitted to the hospitalist service for further management.     Today's information   Patient seen and examined at bedside, no family member at bedside   No overnight events reported   Patient on phone, and not interested in speaking to me   Vitals reviewed and stable on room air -bp control improving   Potassium of 3.3, creatinine 1.08, improving, troponin negative x1   Echo with EF of 60-65%  Chest x-ray shows bibasilar opacity with a small effusion       PHYSICAL EXAM:     GENERAL: awake, alert, oriented and in no acute distress, non-toxic appearing   HEENT: normocephalic atraumatic   NECK: supple   LUNGS:  Bibasilar crackles, no accessory muscle use   CVS:  Irregularly irregular, normal peripheral perfusion, trace edema  ABD: Soft, non-tender, non-distended, bowel sounds present  EXTREMITIES: no clubbing or cyanosis  SKIN: Warm, dry.   NEURO: alert and oriented, grossly without focal deficits   PSYCHIATRIC: Cooperative    ASSESSMENT & PLAN:   Hypertensive urgency , improving  Likely diastolic  heart failure 2/2 above    Acute kidney injury, improving  Hypokalemia   Paroxysmal Afib on Xarelto        Hx: HTN, DM2, gout, asthma, CAD, AFib on Xarelto     Plan  Continue IV Lasix 40 mg q.day   Replete with p.o. potassium 40 x 2, check magnesium levels   Echo was reviewed with EF of 60-65%   Follow up with [facility] recs   Rate is controlled, continue Xarelto   Home med rec completed     DVT prophylaxis: cont xarelto  Code status: full     Care time 39 minutes   Critical Care diagnoses diastolic CHF exacerbation on IV Lasix    VITAL SIGNS: 24 HRS MIN & MAX LAST   Temp  Min: 97.4 °F (36.3 °C)  Max: 98.7 °F (37.1 °C) 98.7 °F (37.1 °C)   BP  Min: 105/64  Max: 217/107 (!) 147/81   Pulse  Min: 72  Max: 112  72   Resp  Min: 17  Max: 29 18   SpO2  Min: 93 %  Max: 98 % (!) 94 %     I have reviewed the following labs:  Recent Labs   Lab 07/08/24  1549 07/09/24  0344   WBC 6.91 7.45   RBC 4.78 4.48   HGB 11.5* 10.9*   HCT 38.7 36.2*   MCV 81.0 80.8   MCH 24.1* 24.3*   MCHC 29.7* 30.1*   RDW 16.5 16.5   * 335   MPV 10.8* 10.6*     Recent Labs   Lab 07/08/24  1549 07/09/24  0344    142   K 3.3* 3.3*    102   CO2 30 29   BUN 23.4* 17.7   CREATININE 1.25* 1.08*   CALCIUM 10.0 9.2   MG  --  1.70   ALBUMIN 3.6 3.0*   ALKPHOS 78 66   ALT 12 11   AST 19 20   BILITOT 0.5 0.5     Microbiology Results (last 7 days)       ** No results found for the last 168 hours. **             See below for Radiology    Assessment/Plan:      VTE prophylaxis:     Patient condition:  Stable/Fair/Guarded/ Serious/ Critical    Anticipated discharge and Disposition:         All diagnosis and differential diagnosis have been reviewed; assessment and plan has been documented; I have personally reviewed the labs and test results that are presently available; I have reviewed the patients medication list; I have reviewed the consulting providers response and recommendations. I have reviewed or attempted to review medical records based upon  their availability    All of the patient's questions have been  addressed and answered. Patient's is agreeable to the above stated plan. I will continue to monitor closely and make adjustments to medical management as needed.    Portions of this note dictated using EMR integrated voice recognition software, and may be subject to voice recognition errors not corrected at proofreading. Please contact writer for clarification if needed.   _____________________________________________________________________    Malnutrition Status:    Scheduled Med:   atorvastatin  20 mg Oral QHS    flecainide  50 mg Oral Daily    fluticasone furoate-vilanteroL  1 puff Inhalation Daily    furosemide (LASIX) injection  40 mg Intravenous Daily    latanoprost  1 drop Both Eyes Nightly    metFORMIN  500 mg Oral BID WM    metoprolol succinate  25 mg Oral Daily    rivaroxaban  20 mg Oral Daily      Continuous Infusions:     PRN Meds:    Current Facility-Administered Medications:     acetaminophen, 650 mg, Oral, Q8H PRN    acetaminophen, 650 mg, Oral, Q8H PRN    clonazePAM, 0.5 mg, Oral, Daily PRN    cloNIDine, 0.1 mg, Oral, TID PRN    dextrose 10%, 12.5 g, Intravenous, PRN    dextrose 10%, 25 g, Intravenous, PRN    glucagon (human recombinant), 1 mg, Intramuscular, PRN    glucose, 16 g, Oral, PRN    glucose, 24 g, Oral, PRN    insulin aspart U-100, 0-10 Units, Subcutaneous, QID (AC + HS) PRN    melatonin, 6 mg, Oral, Nightly PRN    sodium chloride 0.9%, 10 mL, Intravenous, PRN     Radiology:  I have personally reviewed the following imaging and agree with the radiologist.     X-Ray Chest 1 View  Narrative: EXAMINATION:  XR CHEST 1 VIEW    CLINICAL HISTORY:  Shortness of breath    COMPARISON:  17 June 2024    FINDINGS:  Frontal view of the chest was obtained. Heart is not significantly enlarged.  There is aortic atherosclerosis.  There are bibasilar opacities with small effusions.  There is no pneumothorax.  Impression: Bibasilar opacities  with small pleural effusions.    Electronically signed by: Mckay Chang  Date:    07/08/2024  Time:    16:19      Jeison Garrett MD  Department of Hospital Medicine   [facility]   07/09/2024

## 2024-07-09 NOTE — PROGRESS NOTES
Inpatient Nutrition Evaluation    Admit Date: 7/8/2024   Total duration of encounter: 1 day   Patient Age: 83 y.o.    Nutrition Recommendation/Prescription     Continue 2800 kcal diet as tolerated.  Add heart healthy modifier.  Monitor wt, labs, ad po intake.    Nutrition Assessment     Chart Review    Reason Seen: continuous nutrition monitoring    Malnutrition Screening Tool Results   Have you recently lost weight without trying?: No  Have you been eating poorly because of a decreased appetite?: No   MST Score: 0   Diagnosis:  Hypertensive urgency   Likely diastolic heart failure 2/2 above     Relevant Medical History:    Benign paroxysmal vertigo, bilateral      Bilateral carotid bruits      Chronic idiopathic urticaria      Coronary artery stenosis      Diabetes mellitus without complication      Gout, unspecified      Herpes simplex type 2 infection      HLD (hyperlipidemia)      HTN (hypertension)      Hypercalcemia      Major depressive disorder, single episode, unspecified      Mitral valve regurgitation      Parathyroid adenoma      Paroxysmal atrial fibrillation      Pica      Polydipsia      RBBB      Tortuous artery      Unspecified glaucoma      Urinary frequency      Vitamin D deficiency          Scheduled Medications:  atorvastatin, 20 mg, QHS  flecainide, 50 mg, Daily  fluticasone furoate-vilanteroL, 1 puff, Daily  furosemide (LASIX) injection, 40 mg, Daily  latanoprost, 1 drop, Nightly  metFORMIN, 500 mg, BID WM  metoprolol succinate, 25 mg, Daily  potassium bicarbonate, 40 mEq, Q2H  rivaroxaban, 20 mg, Daily    Continuous Infusions:   PRN Medications:   Current Facility-Administered Medications:     acetaminophen, 650 mg, Oral, Q8H PRN    acetaminophen, 650 mg, Oral, Q8H PRN    clonazePAM, 0.5 mg, Oral, Daily PRN    cloNIDine, 0.1 mg, Oral, TID PRN    dextrose 10%, 12.5 g, Intravenous, PRN    dextrose 10%, 25 g, Intravenous, PRN    glucagon (human recombinant), 1 mg, Intramuscular, PRN    glucose,  "16 g, Oral, PRN    glucose, 24 g, Oral, PRN    insulin aspart U-100, 0-10 Units, Subcutaneous, QID (AC + HS) PRN    melatonin, 6 mg, Oral, Nightly PRN    sodium chloride 0.9%, 10 mL, Intravenous, PRN    Recent Labs   Lab 24  1549 24  0344    142   K 3.3* 3.3*   CALCIUM 10.0 9.2   MG  --  1.70   CO2 30 29   BUN 23.4* 17.7   CREATININE 1.25* 1.08*   EGFRNORACEVR 43 51   GLUCOSE 188* 282*   BILITOT 0.5 0.5   ALKPHOS 78 66   ALT 12 11   AST 19 20   ALBUMIN 3.6 3.0*   WBC 6.91 7.45   HGB 11.5* 10.9*   HCT 38.7 36.2*     Nutrition Orders:  Diet diabetic 2800 Calorie      Appetite/Oral Intake: fair/% of meals  Factors Affecting Nutritional Intake: none identified  Food/Alevism/Cultural Preferences: none reported  Food Allergies: none reported  Last Bowel Movement: 24  Wound(s):  N/A    Comments    24: Pt kept falling asleep while answering questions. Reports fair appetite, no trouble c/s, no gi symptoms. No wt loss reported.    Anthropometrics    Height: 5' 6" (167.6 cm), Height Method: Stated  Last Weight: 93.2 kg (205 lb 7.5 oz) (24 0156), Weight Method: Standard Scale  BMI (Calculated): 33.2  BMI Classification: obese grade I (BMI 30-34.9)     Ideal Body Weight (IBW), Female: 130 lb     % Ideal Body Weight, Female (lb): 153.85 %                    Usual Body Weight (UBW), k.2 kg  % Usual Body Weight: 100.21     Usual Weight Provided By: patient    Wt Readings from Last 5 Encounters:   24 93.2 kg (205 lb 7.5 oz)   24 94.8 kg (209 lb)   24 94.3 kg (208 lb)   24 94.8 kg (209 lb)   24 94.3 kg (208 lb)     Weight Change(s) Since Admission: Wt stable.  Wt Readings from Last 1 Encounters:   24 0156 93.2 kg (205 lb 7.5 oz)   24 1521 90.7 kg (200 lb)   Admit Weight: 90.7 kg (200 lb) (24 1521), Weight Method: Stated    Patient Education     Not applicable.    Nutrition Goals & Monitoring     Dietitian will monitor: food and beverage " intake and weight    Nutrition Risk/Follow-Up: low (follow-up in 5-7 days)  Patients assigned 'low nutrition risk' status do not qualify for a full nutritional assessment but will be monitored and re-evaluated in a 5-7 day time period. Please consult if re-evaluation needed sooner.

## 2024-07-09 NOTE — CONSULTS
[facility]    Cardiology  Consult Note    Patient Name: Ladan Lowe  MRN: [id number]  Admission Date: 7/8/2024  Hospital Length of Stay: 1 days  Code Status: Full Code   Attending Provider:  Refugio Nieves MD  Consulting Provider: Farheen Quintana MD  Primary Care Physician: Júnior Villalobos MD  Principal Problem:Congestive heart failure    Patient information was obtained from patient, past medical records, ER records, and primary team.     Subjective:     Reason for Consult: CHF    HPI: Ladan Lowe is an 83-year-old female, known to Dr. Hall, with history of paroxysmal atrial fibrillation, HTN, HLD, DM, mitral regurgitation, bilateral carotid bruits, coronary artery stenosis, who presented to [facility] ED on 7/8/24 at the request of her PCP due to unstable atrial fibrillation. She has complaints of worsening shortness of breath, SIMENTAL, weakness, and fatigue x 4 weeks. Shortness of breath worse with activity and occurs multiple times per day. She was recently put on maintenance inhaler for moderate persistent asthma which alleviated the shortness of breath for about 7 days but it has since worsened.     Upon arrival to ED, vitals afebrile, /79, HR 89, RR 22, SpO2 94% on RA. EKG showed atrial fibrillation, rate 84 bpm with a RBBB. .2, d-dimer 2.02. CXR showed bilateral pleural effusions. She was given Lasix IV 40 mg and hydralazine 20 IV and was admitted for further management. [facility] was consulted for management of CHF.       PMH: BPPV, bilateral carotid bruits, chronic idiopathic urticaria, coronary artery stenosis, DM, gout, HSV-2, HLD, HTN, hypercalcemia, MDD, mitral regurgitation, parathyroid adenoma, paroxysmal atrial fibrillation, pica, polydipsia, RBBB, tortuous artery, glaucoma, urinary frequency, vitamin D deficiency  PSH: cardioversion, hysterectomy, refractive surgery, squamous cell carcinoma excision  Family History: lung cancer - mother,  heart disease, CAD, heart failure - father  Social History: former smoker, denies alcohol use, denies illicit drug use    Previous Cardiac Diagnostics:   TTE 4/17/24  Study quality is average.  Left ventricle normal in size with moderate concentric left ventile hypertrophy. Global left ventricular systtolic function is noromal with EF 55%. Left ventricular diastolic function is indeterminate.   Moderately enlarged left atrium  Moderate calcfication of aortic valve with miold to moderate aortic stenosis. Oeak velocity = 2.87 cm/s. Mean gradient = 18 mmHg. And Di = 0.45.  Moderate (2+) mitral regurgitation  Mild to moderate (1-2+) tricuspid regurgitation.  The estimated pulmonary artery systolic pressure is 37 mmHg assuming a right atrial pressure of 3 mmHg. Evidence of pulmonary hypertension is noted.     Ambulatory Telemetry Monitor 12/3/23  Good quality study  Predominate rhythm is atrial fibrillation  Minimum HR 67 bpm. Maximum  bpm. Mean HR 78 bpm.   No evidence of AV block  Rare PVC noted  No evidence of ventricular tachycardia  No pauses  Monitor only worn for about 21 hours    Carotid US 11/24/21  Study quality is average  No plaque in proximal right internal carotid artery  No plaque in proximal left internal carotid artery  Antegrade right vertebral artery flow  Antegrade left vertebral artery flow    PET 9/18/17  Stress EKG non-diagnostic  HR recovery is normal  Normal perfusion study, no perfusion defects noted. No evidence of ischemia  Suggestive of low risk for future cardiovascular events  LV cavity noted to be normal. Rest LVEF 63% and rest left ventricular global function is normal  Stress EF 72%  Study quality is good      Review of patient's allergies indicates:  No Known Allergies  No current facility-administered medications on file prior to encounter.     Current Outpatient Medications on File Prior to Encounter   Medication Sig    albuterol (VENTOLIN HFA) 90 mcg/actuation inhaler Inhale 2  puffs into the lungs every 6 (six) hours as needed for Wheezing. Rescue    colchicine (COLCRYS) 0.6 mg tablet TAKE 2 TABLETS BY MOUTH AT THE FIRST SIGN OF A GOUT FLARE FOLLOWED BY 1 TABLET ONE HOUR LATER    colestipoL (COLESTID) 1 gram Tab TAKE 1 TABLET (1 G TOTAL) BY MOUTH 2 (TWO) TIMES DAILY AS NEEDED (WITH MEALS FOR DIARRHEA).    flecainide (TAMBOCOR) 50 MG Tab Take 50 mg by mouth once daily.    irbesartan (AVAPRO) 300 MG tablet Take 300 mg by mouth once daily.    latanoprost 0.005 % ophthalmic solution Place 1 drop into both eyes nightly.    lovastatin (MEVACOR) 20 MG tablet SMARTSI Tablet(s) By Mouth Every Evening    metFORMIN (GLUCOPHAGE-XR) 500 MG ER 24hr tablet TAKE 1 TABLET BY MOUTH EVERY DAY    metoprolol succinate (TOPROL-XL) 25 MG 24 hr tablet Take 25 mg by mouth once daily.    semaglutide (OZEMPIC) 0.25 mg or 0.5 mg (2 mg/3 mL) pen injector Inject 0.5 mg into the skin every 7 days.    XARELTO 20 mg Tab Take 20 mg by mouth once daily.    clonazePAM (KLONOPIN) 0.5 MG tablet Take 1 tablet (0.5 mg total) by mouth daily as needed for Anxiety (at bedtime).    ergocalciferol (ERGOCALCIFEROL) 50,000 unit Cap Take 1 capsule (50,000 Units total) by mouth every 7 days.    estradioL (ESTRACE) 1 MG tablet Take 1 mg by mouth.    fluticasone-salmeterol 230-21 mcg/dose (ADVAIR HFA) 230-21 mcg/actuation HFAA inhaler Inhale 2 puffs into the lungs 2 (two) times daily. Controller    nitrofurantoin, macrocrystal-monohydrate, (MACROBID) 100 MG capsule Take 1 capsule (100 mg total) by mouth 2 (two) times daily.    valACYclovir (VALTREX) 1000 MG tablet Take 2 tablets (2,000 mg total) by mouth 2 (two) times daily. As needed for outbreak for 2 days         Review of Systems   Constitutional:  Negative for chills and fever.   Respiratory:  Positive for shortness of breath. Negative for cough.    Cardiovascular:  Positive for palpitations. Negative for chest pain.   Gastrointestinal:  Negative for abdominal pain, nausea and  vomiting.   Neurological:  Positive for dizziness and light-headedness.     Objective:     Vital Signs (Most Recent):  Temp: 97.5 °F (36.4 °C) (07/09/24 0727)  Pulse: 88 (07/09/24 0727)  Resp: 18 (07/09/24 0727)  BP: (!) 149/84 (07/09/24 0727)  SpO2: (!) 93 % (07/09/24 0727) Vital Signs (24h Range):  Temp:  [97.4 °F (36.3 °C)-97.9 °F (36.6 °C)] 97.5 °F (36.4 °C)  Pulse:  [] 88  Resp:  [17-29] 18  SpO2:  [93 %-98 %] 93 %  BP: (118-217)/() 149/84   Weight: 93.2 kg (205 lb 7.5 oz)  Body mass index is 33.16 kg/m².  SpO2: (!) 93 %       Intake/Output Summary (Last 24 hours) at 7/9/2024 0801  Last data filed at 7/9/2024 0606  Gross per 24 hour   Intake 120 ml   Output 2450 ml   Net -2330 ml     Lines/Drains/Airways       Peripheral Intravenous Line  Duration                  Peripheral IV - Single Lumen 07/08/24 1555 20 G Right Antecubital <1 day                  Significant Labs:  Recent Results (from the past 72 hour(s))   COVID/FLU A&B PCR    Collection Time: 07/08/24  3:24 PM   Result Value Ref Range    Influenza A PCR Not Detected Not Detected    Influenza B PCR Not Detected Not Detected    SARS-CoV-2 PCR Not Detected Not Detected, Negative   APTT    Collection Time: 07/08/24  3:49 PM   Result Value Ref Range    PTT 31.9 23.2 - 33.7 seconds   Comprehensive metabolic panel    Collection Time: 07/08/24  3:49 PM   Result Value Ref Range    Sodium 141 136 - 145 mmol/L    Potassium 3.3 (L) 3.5 - 5.1 mmol/L    Chloride 101 98 - 107 mmol/L    CO2 30 23 - 31 mmol/L    Glucose 188 (H) 82 - 115 mg/dL    Blood Urea Nitrogen 23.4 (H) 9.8 - 20.1 mg/dL    Creatinine 1.25 (H) 0.55 - 1.02 mg/dL    Calcium 10.0 8.4 - 10.2 mg/dL    Protein Total 7.7 (H) 5.8 - 7.6 gm/dL    Albumin 3.6 3.4 - 4.8 g/dL    Globulin 4.1 (H) 2.4 - 3.5 gm/dL    Albumin/Globulin Ratio 0.9 (L) 1.1 - 2.0 ratio    Bilirubin Total 0.5 <=1.5 mg/dL    ALP 78 40 - 150 unit/L    ALT 12 0 - 55 unit/L    AST 19 5 - 34 unit/L    eGFR 43 mL/min/1.73/m2     Anion Gap 10.0 mEq/L    BUN/Creatinine Ratio 19    Brain natriuretic peptide    Collection Time: 07/08/24  3:49 PM   Result Value Ref Range    Natriuretic Peptide 194.2 (H) <=100.0 pg/mL   Protime-INR    Collection Time: 07/08/24  3:49 PM   Result Value Ref Range    PT 17.0 (H) 12.5 - 14.5 seconds    INR 1.4 (H) <=1.3   Troponin I    Collection Time: 07/08/24  3:49 PM   Result Value Ref Range    Troponin-I <0.010 0.000 - 0.045 ng/mL   CBC with Differential    Collection Time: 07/08/24  3:49 PM   Result Value Ref Range    WBC 6.91 4.50 - 11.50 x10(3)/mcL    RBC 4.78 4.20 - 5.40 x10(6)/mcL    Hgb 11.5 (L) 12.0 - 16.0 g/dL    Hct 38.7 37.0 - 47.0 %    MCV 81.0 80.0 - 94.0 fL    MCH 24.1 (L) 27.0 - 31.0 pg    MCHC 29.7 (L) 33.0 - 36.0 g/dL    RDW 16.5 11.5 - 17.0 %    Platelet 408 (H) 130 - 400 x10(3)/mcL    MPV 10.8 (H) 7.4 - 10.4 fL    Neut % 76.1 %    Lymph % 14.0 %    Mono % 8.2 %    Eos % 1.0 %    Basophil % 0.4 %    Lymph # 0.97 0.6 - 4.6 x10(3)/mcL    Neut # 5.25 2.1 - 9.2 x10(3)/mcL    Mono # 0.57 0.1 - 1.3 x10(3)/mcL    Eos # 0.07 0 - 0.9 x10(3)/mcL    Baso # 0.03 <=0.2 x10(3)/mcL    IG# 0.02 0 - 0.04 x10(3)/mcL    IG% 0.3 %    NRBC% 0.0 %   D-Dimer, Quantitative    Collection Time: 07/08/24  3:49 PM   Result Value Ref Range    D-Dimer 2.02 (H) 0.00 - 0.50 ug/mL FEU   Comprehensive metabolic panel    Collection Time: 07/09/24  3:44 AM   Result Value Ref Range    Sodium 142 136 - 145 mmol/L    Potassium 3.3 (L) 3.5 - 5.1 mmol/L    Chloride 102 98 - 107 mmol/L    CO2 29 23 - 31 mmol/L    Glucose 282 (H) 82 - 115 mg/dL    Blood Urea Nitrogen 17.7 9.8 - 20.1 mg/dL    Creatinine 1.08 (H) 0.55 - 1.02 mg/dL    Calcium 9.2 8.4 - 10.2 mg/dL    Protein Total 6.4 5.8 - 7.6 gm/dL    Albumin 3.0 (L) 3.4 - 4.8 g/dL    Globulin 3.4 2.4 - 3.5 gm/dL    Albumin/Globulin Ratio 0.9 (L) 1.1 - 2.0 ratio    Bilirubin Total 0.5 <=1.5 mg/dL    ALP 66 40 - 150 unit/L    ALT 11 0 - 55 unit/L    AST 20 5 - 34 unit/L    eGFR 51  mL/min/1.73/m2    Anion Gap 11.0 mEq/L    BUN/Creatinine Ratio 16    CBC with Differential    Collection Time: 07/09/24  3:44 AM   Result Value Ref Range    WBC 7.45 4.50 - 11.50 x10(3)/mcL    RBC 4.48 4.20 - 5.40 x10(6)/mcL    Hgb 10.9 (L) 12.0 - 16.0 g/dL    Hct 36.2 (L) 37.0 - 47.0 %    MCV 80.8 80.0 - 94.0 fL    MCH 24.3 (L) 27.0 - 31.0 pg    MCHC 30.1 (L) 33.0 - 36.0 g/dL    RDW 16.5 11.5 - 17.0 %    Platelet 335 130 - 400 x10(3)/mcL    MPV 10.6 (H) 7.4 - 10.4 fL    Neut % 80.4 %    Lymph % 8.7 %    Mono % 9.7 %    Eos % 0.4 %    Basophil % 0.4 %    Lymph # 0.65 0.6 - 4.6 x10(3)/mcL    Neut # 5.99 2.1 - 9.2 x10(3)/mcL    Mono # 0.72 0.1 - 1.3 x10(3)/mcL    Eos # 0.03 0 - 0.9 x10(3)/mcL    Baso # 0.03 <=0.2 x10(3)/mcL    IG# 0.03 0 - 0.04 x10(3)/mcL    IG% 0.4 %    NRBC% 0.0 %     Significant Imaging:  Imaging Results              X-Ray Chest 1 View (Final result)  Result time 07/08/24 16:19:25      Final result by Mckay Chang MD (07/08/24 16:19:25)                   Impression:      Bibasilar opacities with small pleural effusions.      Electronically signed by: Mckay Chang  Date:    07/08/2024  Time:    16:19               Narrative:    EXAMINATION:  XR CHEST 1 VIEW    CLINICAL HISTORY:  Shortness of breath    COMPARISON:  17 June 2024    FINDINGS:  Frontal view of the chest was obtained. Heart is not significantly enlarged.  There is aortic atherosclerosis.  There are bibasilar opacities with small effusions.  There is no pneumothorax.                                    EKG:     Telemetry:      Physical Exam  Vitals reviewed.   Constitutional:       General: She is not in acute distress.     Appearance: She is not ill-appearing.   HENT:      Head: Atraumatic.      Mouth/Throat:      Mouth: Mucous membranes are moist.      Pharynx: Oropharynx is clear.   Cardiovascular:      Rate and Rhythm: Normal rate. Rhythm irregularly irregular.      Pulses: Normal pulses.      Heart sounds: S1 normal and S2 normal.  No murmur heard.     No friction rub. No gallop.      Comments: Trace edema to mid shin bilaterally  Pulmonary:      Effort: Pulmonary effort is normal.      Breath sounds: Examination of the right-lower field reveals rales. Examination of the left-lower field reveals rales. Rales present. No wheezing.   Musculoskeletal:      Right lower leg: Edema present.      Left lower leg: Edema present.   Skin:     General: Skin is warm and dry.      Capillary Refill: Capillary refill takes less than 2 seconds.   Neurological:      Mental Status: She is alert and oriented to person, place, and time.       Home Medications:   No current facility-administered medications on file prior to encounter.     Current Outpatient Medications on File Prior to Encounter   Medication Sig Dispense Refill    albuterol (VENTOLIN HFA) 90 mcg/actuation inhaler Inhale 2 puffs into the lungs every 6 (six) hours as needed for Wheezing. Rescue 18 g 1    colchicine (COLCRYS) 0.6 mg tablet TAKE 2 TABLETS BY MOUTH AT THE FIRST SIGN OF A GOUT FLARE FOLLOWED BY 1 TABLET ONE HOUR LATER 18 tablet 1    colestipoL (COLESTID) 1 gram Tab TAKE 1 TABLET (1 G TOTAL) BY MOUTH 2 (TWO) TIMES DAILY AS NEEDED (WITH MEALS FOR DIARRHEA). 180 tablet 1    flecainide (TAMBOCOR) 50 MG Tab Take 50 mg by mouth once daily.      irbesartan (AVAPRO) 300 MG tablet Take 300 mg by mouth once daily.      latanoprost 0.005 % ophthalmic solution Place 1 drop into both eyes nightly.      lovastatin (MEVACOR) 20 MG tablet SMARTSI Tablet(s) By Mouth Every Evening      metFORMIN (GLUCOPHAGE-XR) 500 MG ER 24hr tablet TAKE 1 TABLET BY MOUTH EVERY DAY 90 tablet 1    metoprolol succinate (TOPROL-XL) 25 MG 24 hr tablet Take 25 mg by mouth once daily.      semaglutide (OZEMPIC) 0.25 mg or 0.5 mg (2 mg/3 mL) pen injector Inject 0.5 mg into the skin every 7 days. 3 mL 2    XARELTO 20 mg Tab Take 20 mg by mouth once daily.      clonazePAM (KLONOPIN) 0.5 MG tablet Take 1 tablet (0.5 mg total) by  mouth daily as needed for Anxiety (at bedtime). 30 tablet 1    ergocalciferol (ERGOCALCIFEROL) 50,000 unit Cap Take 1 capsule (50,000 Units total) by mouth every 7 days. 24 capsule 1    estradioL (ESTRACE) 1 MG tablet Take 1 mg by mouth.      fluticasone-salmeterol 230-21 mcg/dose (ADVAIR HFA) 230-21 mcg/actuation HFAA inhaler Inhale 2 puffs into the lungs 2 (two) times daily. Controller 12 g 3    nitrofurantoin, macrocrystal-monohydrate, (MACROBID) 100 MG capsule Take 1 capsule (100 mg total) by mouth 2 (two) times daily. 14 capsule 0    valACYclovir (VALTREX) 1000 MG tablet Take 2 tablets (2,000 mg total) by mouth 2 (two) times daily. As needed for outbreak for 2 days 32 tablet 1     Current Inpatient Medications:    Current Facility-Administered Medications:     acetaminophen tablet 650 mg, 650 mg, Oral, Q8H PRN, Baldomero Barboza MD    acetaminophen tablet 650 mg, 650 mg, Oral, Q8H PRN, Baldomero Barboza MD    cloNIDine tablet 0.1 mg, 0.1 mg, Oral, TID PRN, Baldomero Barboza MD, 0.1 mg at 07/09/24 0456    dextrose 10% bolus 125 mL 125 mL, 12.5 g, Intravenous, PRN, Baldomero Barboza MD    dextrose 10% bolus 250 mL 250 mL, 25 g, Intravenous, PRN, Baldomero Barboza MD    furosemide injection 40 mg, 40 mg, Intravenous, Daily, Baldomero Barboza MD    glucagon (human recombinant) injection 1 mg, 1 mg, Intramuscular, PRN, Baldomero Barboza MD    glucose chewable tablet 16 g, 16 g, Oral, PRNJabari Christopher C, MD    glucose chewable tablet 24 g, 24 g, Oral, PRN, Baldomero Barboza MD    insulin aspart U-100 injection 0-10 Units, 0-10 Units, Subcutaneous, QID (AC + HS) PRN, Baldomero Barboza MD    melatonin tablet 6 mg, 6 mg, Oral, Nightly PRN, Baldomero Barboza MD    rivaroxaban tablet 20 mg, 20 mg, Oral, Daily, Baldomero Barboza MD    sodium chloride 0.9% flush 10 mL, 10 mL, Intravenous, PRN, Baldomero Barboza MD  VTE Risk Mitigation (From admission,  onward)           Ordered     rivaroxaban tablet 20 mg  Daily         07/08/24 2221     IP VTE HIGH RISK PATIENT  Once         07/08/24 2221     Place sequential compression device  Until discontinued         07/08/24 2221                  Assessment:   Paroxysmal atrial fibrillation  Heart failure exacerbation  Hypertension  Type 2 diabetes  Asthma  CAD  bilateral carotid bruits  HLD  mitral regurgitation  Major depressive disorder  RBBB  Plan:   - Continue Lasix 40 mg IV daily  - Strict I&Os  - Fluid restriction <2L daily, low salt diet  - Daily weights  - TTE pending read  - Continue metoprolol 25 mg qd and flecainide 50 mg qd for rate control  - DVT ppx: Xarelto    We will follow along. Thank you for your consult.     Farheen Quintana MD  Cardiology  [facility] - 6th Floor Medical Telemetry  07/09/2024

## 2024-07-09 NOTE — NURSING
Nurses Note -- 4 Eyes      7/9/2024   1:53 AM      Skin assessed during: Admit      [x] No Altered Skin Integrity Present    [x]Prevention Measures Documented      [] Yes- Altered Skin Integrity Present or Discovered   [] LDA Added if Not in Epic (Describe Wound)   [] New Altered Skin Integrity was Present on Admit and Documented in LDA   [] Wound Image Taken    Wound Care Consulted? No    Attending Nurse:  Cait Mixon RN     Second RN/Staff Member:  Kayla Steele RN

## 2024-07-09 NOTE — ED PROVIDER NOTES
Encounter Date: 7/8/2024    SCRIBE #1 NOTE: I, Lesia Archuleta, am scribing for, and in the presence of,  Miah Bass IV, MD. I have scribed the following portions of the note - the EKG reading. Other sections scribed: HPI, ROS, PE.       History     Chief Complaint   Patient presents with    Shortness of Breath     Pt c/o SOB x 4 weeks, sent by Dr. Villalobos for unstable afib . Denies chest pain. O2 94% on RA     83 year old female with history of Afib, CAD, asthma, DM, HTN, HLD, mitral valve regurgitation presents to the ED with complaints of SOB. Pt reports that she was having symptoms of SOB, low appetite, and generalized weakness a few weeks ago that went away after PCP put her on new inhaler for 3 weeks. She notes that her symptoms came back on the 7th day of using the inhaler. She denies orthopnea, cough, chest pain, fevers, and rhinorrhea. Pt complains of leg swelling onset this week. She denies history of CHF and is not on fluid pills.     Per chart review, pt was seen in clinic today and was told to come to the ED for cardio evaluation.     The history is provided by the patient. No  was used.     Review of patient's allergies indicates:  No Known Allergies  Past Medical History:   Diagnosis Date    Benign paroxysmal vertigo, bilateral     Bilateral carotid bruits     Chronic idiopathic urticaria     Coronary artery stenosis     Diabetes mellitus without complication     Gout, unspecified     Herpes simplex type 2 infection     HLD (hyperlipidemia)     HTN (hypertension)     Hypercalcemia     Major depressive disorder, single episode, unspecified     Mitral valve regurgitation     Parathyroid adenoma     Paroxysmal atrial fibrillation     Pica     Polydipsia     RBBB     Tortuous artery     Unspecified glaucoma     Urinary frequency     Vitamin D deficiency      Past Surgical History:   Procedure Laterality Date    CARDIOVERSION      HYSTERECTOMY      REFRACTIVE SURGERY      SQUAMOUS CELL  CARCINOMA EXCISION       Family History   Problem Relation Name Age of Onset    Lung cancer Mother Helga solorzano     Cancer Mother Helga solorzano     Heart failure Father Zeb BARROW ryder     Coronary artery disease Father Zeb soler     Heart disease Father Zeb BARROW ryder     Alcohol abuse Sister Funmi     Schizophrenia Sister Funmi     Diabetes Maternal Aunt Magda tello      Social History     Tobacco Use    Smoking status: Former     Current packs/day: 0.00     Types: Cigarettes    Smokeless tobacco: Never   Substance Use Topics    Alcohol use: Never    Drug use: Never     Review of Systems   Constitutional:  Positive for appetite change. Negative for fever.        +generalized weakness.   Respiratory:  Positive for shortness of breath. Negative for cough.    Cardiovascular:  Positive for leg swelling. Negative for chest pain.       Physical Exam     Initial Vitals [07/08/24 1521]   BP Pulse Resp Temp SpO2   (!) 195/79 89 (!) 22 97.5 °F (36.4 °C) (!) 94 %      MAP       --         Physical Exam    Nursing note and vitals reviewed.  Constitutional: She is not diaphoretic. No distress.   HENT:   Head: Normocephalic and atraumatic.   Neck: Neck supple.   Normal range of motion.  Cardiovascular:  Normal rate and regular rhythm.           No murmur heard.  Pulmonary/Chest: Breath sounds normal. No respiratory distress.   Bibasiler crackles     Abdominal: Abdomen is soft. She exhibits no distension. There is no abdominal tenderness.   Musculoskeletal:         General: Edema (trace, bilateral LE) present.      Cervical back: Normal range of motion and neck supple.     Neurological: She is alert and oriented to person, place, and time. She has normal strength. No cranial nerve deficit or sensory deficit.   Skin: Skin is warm. Capillary refill takes less than 2 seconds.   Psychiatric: She has a normal mood and affect.         ED Course   Procedures  Labs Reviewed   COMPREHENSIVE METABOLIC PANEL - Abnormal; Notable for the  following components:       Result Value    Potassium 3.3 (*)     Glucose 188 (*)     Blood Urea Nitrogen 23.4 (*)     Creatinine 1.25 (*)     Protein Total 7.7 (*)     Globulin 4.1 (*)     Albumin/Globulin Ratio 0.9 (*)     All other components within normal limits   B-TYPE NATRIURETIC PEPTIDE - Abnormal; Notable for the following components:    Natriuretic Peptide 194.2 (*)     All other components within normal limits   PROTIME-INR - Abnormal; Notable for the following components:    PT 17.0 (*)     INR 1.4 (*)     All other components within normal limits   CBC WITH DIFFERENTIAL - Abnormal; Notable for the following components:    Hgb 11.5 (*)     MCH 24.1 (*)     MCHC 29.7 (*)     Platelet 408 (*)     MPV 10.8 (*)     All other components within normal limits   APTT - Normal   TROPONIN I - Normal   COVID/FLU A&B PCR - Normal    Narrative:     The Xpert Xpress SARS-CoV-2/FLU/RSV plus is a rapid, multiplexed real-time PCR test intended for the simultaneous qualitative detection and differentiation of SARS-CoV-2, Influenza A, Influenza B, and respiratory syncytial virus (RSV) viral RNA in either nasopharyngeal swab or nasal swab specimens.         CBC W/ AUTO DIFFERENTIAL    Narrative:     The following orders were created for panel order CBC auto differential.  Procedure                               Abnormality         Status                     ---------                               -----------         ------                     CBC with Differential[[id number]]       Abnormal            Final result                 Please view results for these tests on the individual orders.     EKG Readings: (Independently Interpreted)   Initial Reading: No STEMI. Rhythm: Atrial Fibrillation. Heart Rate: 84. Ectopy: No Ectopy. Conduction: RBBB. ST Segments: Normal ST Segments. T Waves: Normal. Clinical Impression: Atrial Fibrillation Other Impression: No ischemia.   EKG performed at 18:19.        Imaging Results               X-Ray Chest 1 View (Final result)  Result time 07/08/24 16:19:25      Final result by Mckay Chang MD (07/08/24 16:19:25)                   Impression:      Bibasilar opacities with small pleural effusions.      Electronically signed by: Mckay Chang  Date:    07/08/2024  Time:    16:19               Narrative:    EXAMINATION:  XR CHEST 1 VIEW    CLINICAL HISTORY:  Shortness of breath    COMPARISON:  17 June 2024    FINDINGS:  Frontal view of the chest was obtained. Heart is not significantly enlarged.  There is aortic atherosclerosis.  There are bibasilar opacities with small effusions.  There is no pneumothorax.                                       Medications   sodium chloride 0.9% bolus 1,000 mL 1,000 mL (0 mLs Intravenous Stopped 7/8/24 1656)   hydrALAZINE injection 20 mg (20 mg Intravenous Given 7/8/24 2007)   potassium bicarbonate disintegrating tablet 60 mEq (60 mEq Oral Given 7/8/24 2106)   furosemide injection 40 mg (40 mg Intravenous Given 7/8/24 2152)   acetaminophen 1,000 mg/100 mL (10 mg/mL) injection 1,000 mg (0 mg Intravenous Stopped 7/8/24 2132)     Medical Decision Making  Per chart review patient has been having severe dyspnea on exertion saw her PCP today had a near syncopal episode in clinic PCP sent her here for cardiac evaluation [facility] consultation possible admission.  Patient is reporting shortness of breath not relieved by her inhaler does not feel like her normal asthma related dyspnea, also having some trace swelling in her legs no history of heart failure on exam she is well-appearing has some pleural effusions on her x-ray trace edema in her legs consistent with new onset heart failure will discuss with [facility]    Differential diagnosis (including but not limited to):   Judging by the patient's chief complaint and pertinent history, the patient has the following possible differential diagnoses, including but not limited to the following.  Some of these are deemed to be lower  likelihood and some more likely based on my physical exam and history combined with possible lab work and/or imaging studies.   Please see the pertinent studies, and refer to the HPI.  Some of these diagnoses will take further evaluation to fully rule out, perhaps as an outpatient and the patient was encouraged to follow up when discharged for more comprehensive evaluation.    ACS, pneumonia, COVID/Flu, congestive heart failure, asthma, COPD, pleural effusion, pulmonary edema, acute bronchitis, PE, pneumothorax, hemothorax, aortic dissection, electrolyte abnormalities, anemia, anxiety       Problems Addressed:  Congestive heart failure, unspecified HF chronicity, unspecified heart failure type: acute illness or injury that poses a threat to life or bodily functions  Shortness of breath: acute illness or injury that poses a threat to life or bodily functions    Amount and/or Complexity of Data Reviewed  External Data Reviewed: notes.     Details: Per chart review patient has been having severe dyspnea on exertion saw her PCP today had a near syncopal episode in clinic PCP sent her here for cardiac evaluation [facility] consultation possible admission.  Patient is reporting shortness of breath not relieved by her inhaler does not feel like her normal asthma related dyspnea, also having some trace swelling in her legs no history of heart failure on exam she is well-appearing has some pleural effusions on her x-ray trace edema in her legs consistent with new onset heart failure will discuss with [facility] [AC]    Labs: ordered.  Radiology: ordered and independent interpretation performed.     Details: Chest x-ray with bilateral pleural effusions per my read  ECG/medicine tests: ordered and independent interpretation performed.     Details: Initial Reading: No STEMI. Rhythm: Atrial Fibrillation. Heart Rate: 84. Ectopy: No Ectopy. Conduction: RBBB. ST Segments: Normal ST Segments. T Waves: Normal. Clinical Impression: Atrial  Fibrillation Other Impression: No ischemia.   EKG performed at 18:19.      Discussion of management or test interpretation with external provider(s): Discussed with cardiology they recommend admission discussed with hospitalist who will admit    Risk  Prescription drug management.  Drug therapy requiring intensive monitoring for toxicity.  Decision regarding hospitalization.              Attending Attestation:           Physician Attestation for Scribe:  Physician Attestation Statement for Scribe #1: I, Miah Bass IV, MD, reviewed documentation, as scribed by Lesia Archuleta in my presence, and it is both accurate and complete.             ED Course as of 07/08/24 2200 Mon Jul 08, 2024 1942 Per chart review patient has been having severe dyspnea on exertion saw her PCP today had a near syncopal episode in clinic PCP sent her here for cardiac evaluation [facility] consultation possible admission.  Patient is reporting shortness of breath not relieved by her inhaler does not feel like her normal asthma related dyspnea, also having some trace swelling in her legs no history of heart failure on exam she is well-appearing has some pleural effusions on her x-ray trace edema in her legs consistent with new onset heart failure will discuss with [facility] [AC]   1959 Antonio with [facility] recommends admission CHF workup [AC]   2106 Hospitalist will admit   [AC]      ED Course User Index  [AC] Miah Bass IV, MD                           Clinical Impression:  Final diagnoses:  [R06.02] Shortness of breath  [I50.9] Congestive heart failure, unspecified HF chronicity, unspecified heart failure type (Primary)          ED Disposition Condition    Admit Stable                Miah Bass IV, MD  07/08/24 2203

## 2024-07-09 NOTE — PLAN OF CARE
Problem: Adult Inpatient Plan of Care  Goal: Plan of Care Review  Outcome: Progressing  Goal: Patient-Specific Goal (Individualized)  Outcome: Progressing  Goal: Absence of Hospital-Acquired Illness or Injury  Outcome: Progressing  Goal: Optimal Comfort and Wellbeing  Outcome: Progressing  Goal: Readiness for Transition of Care  Outcome: Progressing     Problem: Diabetes Comorbidity  Goal: Blood Glucose Level Within Targeted Range  Outcome: Progressing     Problem: Fall Injury Risk  Goal: Absence of Fall and Fall-Related Injury  Outcome: Progressing     See today's assessment and documentation for interventions

## 2024-07-09 NOTE — H&P
[facility] Hospital Medicine History & Physical Examination       Patient Name: Ladan Lowe  MRN: [id number]  Patient Class: IP- Inpatient   Admission Date: 7/8/2024  3:26 PM  Length of Stay: 0  Admitting Service: Hospital Medicine   Attending Physician: Baldomero Barboza MD   Primary Care Provider: Júnior Villalobos MD  History source: EMR, patient and/or patient's family    CHIEF COMPLAINT   Shortness of Breath (Pt c/o SOB x 4 weeks, sent by Dr. Villalobos for unstable afib . Denies chest pain. O2 94% on RA)    HISTORY OF PRESENT ILLNESS:   Patient is an 83-year-old female with multiple comorbidities as listed below including HTN, DM2, gout, asthma, CAD, AFib on Xarelto, who presented to the ER with complaints of 4 weeks worsening dyspnea.  She saw her PCP today who who noted with ambulation she was having presyncopal episodes and tachycardia along with dyspnea and therefore referred her to the ER for further evaluation.      She arrived to the ER afebrile, significantly hypertensive, maintaining adequate sats on room air.  EKG showed atrial fibrillation.  Laboratory work was significant for a BNP of 190.  Chest x-ray showed bibasilar opacities with small effusions.  She was given IV Lasix and IV antihypertensives admitted to the hospitalist service for further management.    PAST MEDICAL HISTORY:     Past Medical History:   Diagnosis Date    Benign paroxysmal vertigo, bilateral     Bilateral carotid bruits     Chronic idiopathic urticaria     Coronary artery stenosis     Diabetes mellitus without complication     Gout, unspecified     Herpes simplex type 2 infection     HLD (hyperlipidemia)     HTN (hypertension)     Hypercalcemia     Major depressive disorder, single episode, unspecified     Mitral valve regurgitation     Parathyroid adenoma     Paroxysmal atrial fibrillation     Pica     Polydipsia     RBBB     Tortuous artery     Unspecified glaucoma     Urinary frequency      Vitamin D deficiency        PAST SURGICAL HISTORY:     Past Surgical History:   Procedure Laterality Date    CARDIOVERSION      HYSTERECTOMY      REFRACTIVE SURGERY      SQUAMOUS CELL CARCINOMA EXCISION         ALLERGIES:   Patient has no known allergies.    FAMILY HISTORY:   Reviewed and non-contributory     SOCIAL HISTORY:   Screening for Social Drivers for health: Patient screened for food insecurity, housing instability, transportation needs, utility   difficulties, and interpersonal safety (select all that apply as identified as concern)  []Housing or Food  []Transportation Needs  []Utility Difficulties  []Interpersonal safety  [x]None  Social History     Tobacco Use    Smoking status: Former     Current packs/day: 0.00     Types: Cigarettes    Smokeless tobacco: Never   Substance Use Topics    Alcohol use: Never        HOME MEDICATIONS:     Prior to Admission medications    Medication Sig   albuterol (VENTOLIN HFA) 90 mcg/actuation inhaler Inhale 2 puffs into the lungs every 6 (six) hours as needed for Wheezing. Rescue   clonazePAM (KLONOPIN) 0.5 MG tablet Take 1 tablet (0.5 mg total) by mouth daily as needed for Anxiety (at bedtime).   colchicine (COLCRYS) 0.6 mg tablet TAKE 2 TABLETS BY MOUTH AT THE FIRST SIGN OF A GOUT FLARE FOLLOWED BY 1 TABLET ONE HOUR LATER   colestipoL (COLESTID) 1 gram Tab TAKE 1 TABLET (1 G TOTAL) BY MOUTH 2 (TWO) TIMES DAILY AS NEEDED (WITH MEALS FOR DIARRHEA).   ergocalciferol (ERGOCALCIFEROL) 50,000 unit Cap Take 1 capsule (50,000 Units total) by mouth every 7 days.   estradioL (ESTRACE) 1 MG tablet Take 1 mg by mouth.   flecainide (TAMBOCOR) 50 MG Tab Take 50 mg by mouth once daily.   fluticasone-salmeterol 230-21 mcg/dose (ADVAIR HFA) 230-21 mcg/actuation HFAA inhaler Inhale 2 puffs into the lungs 2 (two) times daily. Controller   irbesartan (AVAPRO) 300 MG tablet Take 300 mg by mouth once daily.   latanoprost 0.005 % ophthalmic solution Place 1 drop into both eyes nightly.  "  lovastatin (MEVACOR) 20 MG tablet SMARTSI Tablet(s) By Mouth Every Evening   metFORMIN (GLUCOPHAGE-XR) 500 MG ER 24hr tablet TAKE 1 TABLET BY MOUTH EVERY DAY   metoprolol succinate (TOPROL-XL) 25 MG 24 hr tablet Take 25 mg by mouth once daily.   nitrofurantoin, macrocrystal-monohydrate, (MACROBID) 100 MG capsule Take 1 capsule (100 mg total) by mouth 2 (two) times daily.   semaglutide (OZEMPIC) 0.25 mg or 0.5 mg (2 mg/3 mL) pen injector Inject 0.5 mg into the skin every 7 days.   valACYclovir (VALTREX) 1000 MG tablet Take 2 tablets (2,000 mg total) by mouth 2 (two) times daily. As needed for outbreak for 2 days   XARELTO 20 mg Tab Take 20 mg by mouth once daily.       REVIEW OF SYSTEMS:   Except as documented, all other systems reviewed and negative     PHYSICAL EXAM:   T 97.5 °F (36.4 °C)   BP (!) 189/77   P 93   RR (!) 27   O2 96 %  GENERAL: awake, alert, oriented and in no acute distress, non-toxic appearing   HEENT: normocephalic atraumatic   NECK: supple   LUNGS:  Bibasilar crackles, no accessory muscle use   CVS:  Irregularly irregular, normal peripheral perfusion, trace edema  ABD: Soft, non-tender, non-distended, bowel sounds present  EXTREMITIES: no clubbing or cyanosis  SKIN: Warm, dry.   NEURO: alert and oriented, grossly without focal deficits   PSYCHIATRIC: Cooperative    LABS AND IMAGING:     Recent Labs     24  1549   WBC 6.91   RBC 4.78   HGB 11.5*   HCT 38.7   MCV 81.0   MCH 24.1*   MCHC 29.7*   RDW 16.5   *     No results for input(s): "LACTIC" in the last 72 hours.  Recent Labs     24  1549   INR 1.4*   APTT 31.9     No results for input(s): "HGBA1C", "CHOL", "TRIG", "LDL", "VLDL", "HDL" in the last 72 hours.   Recent Labs     24  1549      K 3.3*   CO2 30   BUN 23.4*   CREATININE 1.25*   GLUCOSE 188*   CALCIUM 10.0   ALBUMIN 3.6   GLOBULIN 4.1*   ALKPHOS 78   ALT 12   AST 19   BILITOT 0.5     Recent Labs     24  1549   .2*   TROPONINI <0.010      "     X-Ray Chest 1 View  Narrative: EXAMINATION:  XR CHEST 1 VIEW    CLINICAL HISTORY:  Shortness of breath    COMPARISON:  17 June 2024    FINDINGS:  Frontal view of the chest was obtained. Heart is not significantly enlarged.  There is aortic atherosclerosis.  There are bibasilar opacities with small effusions.  There is no pneumothorax.  Impression: Bibasilar opacities with small pleural effusions.    Electronically signed by: Mckay Chang  Date:    07/08/2024  Time:    16:19      ASSESSMENT & PLAN:   Hypertensive urgency   Likely diastolic heart failure 2/2 above     Hx: HTN, DM2, gout, asthma, CAD, AFib on Xarelto    -obtain echo   -monitor response to IV Lasix   -afterload reduction   -resume home meds as appropriate pending med rec   -consultation to Cardiology    DVT prophylaxis: cont xarelto  Code status: full    If patient was admitted under observational status it is with my approval/permission.     At least 55 min was spent on this history and physical.  Time seen: 10PM 7/8  Critical care time = 35 min; Critical care diagnosis = hypertensive urgency/IV antihypertensives  Baldomero Barboza MD

## 2024-07-10 VITALS
WEIGHT: 206.5 LBS | HEART RATE: 86 BPM | DIASTOLIC BLOOD PRESSURE: 66 MMHG | OXYGEN SATURATION: 93 % | HEIGHT: 66 IN | TEMPERATURE: 98 F | RESPIRATION RATE: 18 BRPM | BODY MASS INDEX: 33.19 KG/M2 | SYSTOLIC BLOOD PRESSURE: 116 MMHG

## 2024-07-10 LAB
ANION GAP SERPL CALC-SCNC: 10 MEQ/L
APICAL FOUR CHAMBER EJECTION FRACTION: 59 %
APICAL TWO CHAMBER EJECTION FRACTION: 64 %
ASCENDING AORTA: 3 CM
AV INDEX (PROSTH): 0.37
AV MEAN GRADIENT: 19 MMHG
AV PEAK GRADIENT: 32 MMHG
AV VALVE AREA BY VELOCITY RATIO: 1.15 CM²
AV VALVE AREA: 1.28 CM²
AV VELOCITY RATIO: 0.33
BSA FOR ECHO PROCEDURE: 2.06 M2
BUN SERPL-MCNC: 20.3 MG/DL (ref 9.8–20.1)
CALCIUM SERPL-MCNC: 9.6 MG/DL (ref 8.4–10.2)
CHLORIDE SERPL-SCNC: 101 MMOL/L (ref 98–107)
CO2 SERPL-SCNC: 30 MMOL/L (ref 23–31)
CREAT SERPL-MCNC: 0.99 MG/DL (ref 0.55–1.02)
CREAT/UREA NIT SERPL: 21
CV ECHO LV RWT: 0.51 CM
DOP CALC AO PEAK VEL: 2.83 M/S
DOP CALC AO VTI: 49.7 CM
DOP CALC LVOT AREA: 3.5 CM2
DOP CALC LVOT DIAMETER: 2.1 CM
DOP CALC LVOT PEAK VEL: 0.94 M/S
DOP CALC LVOT STROKE VOLUME: 63.7 CM3
DOP CALC MV VTI: 20.9 CM
DOP CALCLVOT PEAK VEL VTI: 18.4 CM
E/E' RATIO: 22.71 M/S
ECHO LV POSTERIOR WALL: 1.1 CM (ref 0.6–1.1)
FRACTIONAL SHORTENING: 35 % (ref 28–44)
GFR SERPLBLD CREATININE-BSD FMLA CKD-EPI: 57 ML/MIN/1.73/M2
GLUCOSE SERPL-MCNC: 128 MG/DL (ref 82–115)
INTERVENTRICULAR SEPTUM: 1.1 CM (ref 0.6–1.1)
LEFT ATRIUM AREA SYSTOLIC (APICAL 2 CHAMBER): 25.2 CM2
LEFT ATRIUM AREA SYSTOLIC (APICAL 4 CHAMBER): 32.8 CM2
LEFT ATRIUM SIZE: 5.1 CM
LEFT ATRIUM VOLUME INDEX MOD: 58.4 ML/M2
LEFT ATRIUM VOLUME MOD: 118 CM3
LEFT INTERNAL DIMENSION IN SYSTOLE: 2.8 CM (ref 2.1–4)
LEFT VENTRICLE DIASTOLIC VOLUME INDEX: 41.14 ML/M2
LEFT VENTRICLE DIASTOLIC VOLUME: 83.1 ML
LEFT VENTRICLE END DIASTOLIC VOLUME APICAL 2 CHAMBER: 88.5 ML
LEFT VENTRICLE END DIASTOLIC VOLUME APICAL 4 CHAMBER: 94.5 ML
LEFT VENTRICLE END SYSTOLIC VOLUME APICAL 2 CHAMBER: 89.3 ML
LEFT VENTRICLE END SYSTOLIC VOLUME APICAL 4 CHAMBER: 133 ML
LEFT VENTRICLE MASS INDEX: 81 G/M2
LEFT VENTRICLE SYSTOLIC VOLUME INDEX: 14.7 ML/M2
LEFT VENTRICLE SYSTOLIC VOLUME: 29.6 ML
LEFT VENTRICULAR INTERNAL DIMENSION IN DIASTOLE: 4.3 CM (ref 3.5–6)
LEFT VENTRICULAR MASS: 162.94 G
LV LATERAL E/E' RATIO: 22.71 M/S
LV SEPTAL E/E' RATIO: 22.71 M/S
LVED V (TEICH): 83.1 ML
LVES V (TEICH): 29.6 ML
LVOT MG: 2 MMHG
LVOT MV: 0.65 CM/S
MAGNESIUM SERPL-MCNC: 1.7 MG/DL (ref 1.6–2.6)
MV MEAN GRADIENT: 6 MMHG
MV PEAK E VEL: 1.59 M/S
MV PEAK GRADIENT: 10 MMHG
MV VALVE AREA BY CONTINUITY EQUATION: 3.05 CM2
OHS CV RV/LV RATIO: 0.84 CM
OHS LV EJECTION FRACTION SIMPSONS BIPLANE MOD: 62 %
PISA TR MAX VEL: 2.69 M/S
POCT GLUCOSE: 119 MG/DL (ref 70–110)
POCT GLUCOSE: 186 MG/DL (ref 70–110)
POTASSIUM SERPL-SCNC: 3.9 MMOL/L (ref 3.5–5.1)
RA WIDTH: 4.5 CM
RIGHT VENTRICULAR END-DIASTOLIC DIMENSION: 3.6 CM
SODIUM SERPL-SCNC: 141 MMOL/L (ref 136–145)
TDI LATERAL: 0.07 M/S
TDI SEPTAL: 0.07 M/S
TDI: 0.07 M/S
TR MAX PG: 29 MMHG
TRICUSPID ANNULAR PLANE SYSTOLIC EXCURSION: 1.87 CM
Z-SCORE OF LEFT VENTRICULAR DIMENSION IN END DIASTOLE: -3.25
Z-SCORE OF LEFT VENTRICULAR DIMENSION IN END SYSTOLE: -2.1

## 2024-07-10 PROCEDURE — 80048 BASIC METABOLIC PNL TOTAL CA: CPT | Performed by: INTERNAL MEDICINE

## 2024-07-10 PROCEDURE — 83735 ASSAY OF MAGNESIUM: CPT | Performed by: NURSE PRACTITIONER

## 2024-07-10 PROCEDURE — 25000003 PHARM REV CODE 250: Performed by: INTERNAL MEDICINE

## 2024-07-10 PROCEDURE — 63600175 PHARM REV CODE 636 W HCPCS: Performed by: NURSE PRACTITIONER

## 2024-07-10 PROCEDURE — 63600175 PHARM REV CODE 636 W HCPCS: Performed by: INTERNAL MEDICINE

## 2024-07-10 PROCEDURE — 25000242 PHARM REV CODE 250 ALT 637 W/ HCPCS: Performed by: INTERNAL MEDICINE

## 2024-07-10 PROCEDURE — 36415 COLL VENOUS BLD VENIPUNCTURE: CPT | Performed by: INTERNAL MEDICINE

## 2024-07-10 PROCEDURE — 25000003 PHARM REV CODE 250: Performed by: NURSE PRACTITIONER

## 2024-07-10 RX ORDER — MAGNESIUM SULFATE HEPTAHYDRATE 40 MG/ML
2 INJECTION, SOLUTION INTRAVENOUS
Status: COMPLETED | OUTPATIENT
Start: 2024-07-10 | End: 2024-07-10

## 2024-07-10 RX ORDER — POTASSIUM CHLORIDE 20 MEQ/1
40 TABLET, EXTENDED RELEASE ORAL ONCE
Status: COMPLETED | OUTPATIENT
Start: 2024-07-10 | End: 2024-07-10

## 2024-07-10 RX ORDER — FUROSEMIDE 40 MG/1
40 TABLET ORAL DAILY
Qty: 30 TABLET | Refills: 11 | Status: SHIPPED | OUTPATIENT
Start: 2024-07-10 | End: 2025-07-10

## 2024-07-10 RX ADMIN — POTASSIUM CHLORIDE 40 MEQ: 1500 TABLET, EXTENDED RELEASE ORAL at 11:07

## 2024-07-10 RX ADMIN — MAGNESIUM SULFATE HEPTAHYDRATE 2 G: 40 INJECTION, SOLUTION INTRAVENOUS at 02:07

## 2024-07-10 RX ADMIN — METFORMIN HYDROCHLORIDE 500 MG: 500 TABLET, FILM COATED ORAL at 09:07

## 2024-07-10 RX ADMIN — FLUTICASONE FUROATE AND VILANTEROL TRIFENATATE 1 PUFF: 200; 25 POWDER RESPIRATORY (INHALATION) at 09:07

## 2024-07-10 RX ADMIN — RIVAROXABAN 20 MG: 10 TABLET, FILM COATED ORAL at 09:07

## 2024-07-10 RX ADMIN — METOPROLOL SUCCINATE 25 MG: 25 TABLET, EXTENDED RELEASE ORAL at 09:07

## 2024-07-10 RX ADMIN — FUROSEMIDE 40 MG: 10 INJECTION, SOLUTION INTRAMUSCULAR; INTRAVENOUS at 09:07

## 2024-07-10 RX ADMIN — MAGNESIUM SULFATE HEPTAHYDRATE 2 G: 40 INJECTION, SOLUTION INTRAVENOUS at 11:07

## 2024-07-10 RX ADMIN — METFORMIN HYDROCHLORIDE 500 MG: 500 TABLET, FILM COATED ORAL at 04:07

## 2024-07-10 RX ADMIN — CLONIDINE HYDROCHLORIDE 0.1 MG: 0.1 TABLET ORAL at 04:07

## 2024-07-10 RX ADMIN — FLECAINIDE ACETATE 50 MG: 50 TABLET ORAL at 09:07

## 2024-07-10 NOTE — PROGRESS NOTES
"[facility] 6[facility] Medical Telemetry    Cardiology  Progress Note    Patient Name: Ladan Lowe  MRN: [id number]  Admission Date: 7/8/2024  Hospital Length of Stay: 2 days  Code Status: Full Code   Attending Provider:  Refugio Nieves MD  Consulting Provider: MARIMAR Hein  Primary Care Physician: Júnior Villalobos MD  Principal Problem:Congestive heart failure    Patient information was obtained from patient, past medical records, ER records, and primary team.     Subjective:     Reason for Consult: CHF    HPI: Ladan Lowe is an 83-year-old female, known to Dr. Hall, with history of paroxysmal atrial fibrillation, HTN, HLD, DM, mitral regurgitation, bilateral carotid bruits, coronary artery stenosis, who presented to [facility] ED on 7/8/24 at the request of her PCP due to unstable atrial fibrillation. She has complaints of worsening shortness of breath, SIMENTAL, weakness, and fatigue x 4 weeks. Shortness of breath worse with activity and occurs multiple times per day. She was recently put on maintenance inhaler for moderate persistent asthma which alleviated the shortness of breath for about 7 days but it has since worsened.     Upon arrival to ED, vitals afebrile, /79, HR 89, RR 22, SpO2 94% on RA. EKG showed atrial fibrillation, rate 84 bpm with a RBBB. .2, d-dimer 2.02. CXR showed bilateral pleural effusions. She was given Lasix IV 40 mg and hydralazine 20 IV and was admitted for further management. [facility] was consulted for management of CHF.     7.10.24: Sitting up on side of bed in NAD. Reports SIMENTAL much improved.  BLE edema still noted (pt states this has improved).  VSS.  Afib with CVR.  Net -600 ml.  Weight unchanged from yesterday.  Pt states "I feel a lot better today."      PMH: BPPV, bilateral carotid bruits, chronic idiopathic urticaria, coronary artery stenosis, DM, gout, HSV-2, HLD, HTN, hypercalcemia, MDD, mitral regurgitation, parathyroid adenoma, paroxysmal " atrial fibrillation, pica, polydipsia, RBBB, tortuous artery, glaucoma, urinary frequency, vitamin D deficiency  PSH: cardioversion, hysterectomy, refractive surgery, squamous cell carcinoma excision  Family History: lung cancer - mother, heart disease, CAD, heart failure - father  Social History: former smoker, denies alcohol use, denies illicit drug use    Previous Cardiac Diagnostics:   TTE (7.9.24):    Left Ventricle: There is mild concentric hypertrophy. There is normal systolic function with a visually estimated ejection fraction of 60 - 65%. There is indeterminate diastolic function.Elevated left ventricular filling pressure.    Right Ventricle: Normal right ventricular cavity size. Systolic function is normal.    Left Atrium: Left atrium is severely dilated.    Aortic Valve: Mildly calcified cusps. Mildly restricted motion. There is mild stenosis. Aortic valve area by VTI is 1.28 cm². Aortic valve peak velocity is 2.83 m/s. Mean gradient is 19 mmHg. The dimensionless index is 0.37.    Mitral Valve: Mildly thickened leaflets. There is moderate regurgitation.    Tricuspid Valve: There is mild regurgitation. The estimated PA systolic pressure is at least 29 mmHg.    Pulmonic Valve: There is mild regurgitation.    Pericardium: There is no pericardial effusion    TTE 4/17/24  Study quality is average.  Left ventricle normal in size with moderate concentric left ventile hypertrophy. Global left ventricular systtolic function is noromal with EF 55%. Left ventricular diastolic function is indeterminate.   Moderately enlarged left atrium  Moderate calcfication of aortic valve with miold to moderate aortic stenosis. Oeak velocity = 2.87 cm/s. Mean gradient = 18 mmHg. And Di = 0.45.  Moderate (2+) mitral regurgitation  Mild to moderate (1-2+) tricuspid regurgitation.  The estimated pulmonary artery systolic pressure is 37 mmHg assuming a right atrial pressure of 3 mmHg. Evidence of pulmonary hypertension is noted.      Ambulatory Telemetry Monitor 12/3/23  Good quality study  Predominate rhythm is atrial fibrillation  Minimum HR 67 bpm. Maximum  bpm. Mean HR 78 bpm.   No evidence of AV block  Rare PVC noted  No evidence of ventricular tachycardia  No pauses  Monitor only worn for about 21 hours    Carotid US 21  Study quality is average  No plaque in proximal right internal carotid artery  No plaque in proximal left internal carotid artery  Antegrade right vertebral artery flow  Antegrade left vertebral artery flow    PET 17  Stress EKG non-diagnostic  HR recovery is normal  Normal perfusion study, no perfusion defects noted. No evidence of ischemia  Suggestive of low risk for future cardiovascular events  LV cavity noted to be normal. Rest LVEF 63% and rest left ventricular global function is normal  Stress EF 72%  Study quality is good      Review of patient's allergies indicates:  No Known Allergies  No current facility-administered medications on file prior to encounter.     Current Outpatient Medications on File Prior to Encounter   Medication Sig    albuterol (VENTOLIN HFA) 90 mcg/actuation inhaler Inhale 2 puffs into the lungs every 6 (six) hours as needed for Wheezing. Rescue    colchicine (COLCRYS) 0.6 mg tablet TAKE 2 TABLETS BY MOUTH AT THE FIRST SIGN OF A GOUT FLARE FOLLOWED BY 1 TABLET ONE HOUR LATER    colestipoL (COLESTID) 1 gram Tab TAKE 1 TABLET (1 G TOTAL) BY MOUTH 2 (TWO) TIMES DAILY AS NEEDED (WITH MEALS FOR DIARRHEA).    flecainide (TAMBOCOR) 50 MG Tab Take 50 mg by mouth once daily.    irbesartan (AVAPRO) 300 MG tablet Take 300 mg by mouth once daily.    latanoprost 0.005 % ophthalmic solution Place 1 drop into both eyes nightly.    lovastatin (MEVACOR) 20 MG tablet SMARTSI Tablet(s) By Mouth Every Evening    metFORMIN (GLUCOPHAGE-XR) 500 MG ER 24hr tablet TAKE 1 TABLET BY MOUTH EVERY DAY    metoprolol succinate (TOPROL-XL) 25 MG 24 hr tablet Take 25 mg by mouth once daily.     semaglutide (OZEMPIC) 0.25 mg or 0.5 mg (2 mg/3 mL) pen injector Inject 0.5 mg into the skin every 7 days.    XARELTO 20 mg Tab Take 20 mg by mouth once daily.    clonazePAM (KLONOPIN) 0.5 MG tablet Take 1 tablet (0.5 mg total) by mouth daily as needed for Anxiety (at bedtime).    ergocalciferol (ERGOCALCIFEROL) 50,000 unit Cap Take 1 capsule (50,000 Units total) by mouth every 7 days.    estradioL (ESTRACE) 1 MG tablet Take 1 mg by mouth.    fluticasone-salmeterol 230-21 mcg/dose (ADVAIR HFA) 230-21 mcg/actuation HFAA inhaler Inhale 2 puffs into the lungs 2 (two) times daily. Controller    nitrofurantoin, macrocrystal-monohydrate, (MACROBID) 100 MG capsule Take 1 capsule (100 mg total) by mouth 2 (two) times daily.    valACYclovir (VALTREX) 1000 MG tablet Take 2 tablets (2,000 mg total) by mouth 2 (two) times daily. As needed for outbreak for 2 days         Review of Systems   Constitutional:  Negative for chills and fever.   Respiratory:  Negative for cough and shortness of breath.         Reports SIMENTAL, but improving   Cardiovascular:  Negative for chest pain and palpitations.   Gastrointestinal:  Negative for abdominal pain, nausea and vomiting.   Neurological:  Negative for dizziness and light-headedness.     Objective:     Vital Signs (Most Recent):  Temp: 97.1 °F (36.2 °C) (07/10/24 0909)  Pulse: 93 (07/10/24 0909)  Resp: 18 (07/10/24 0906)  BP: (!) 128/59 (07/10/24 0909)  SpO2: 95 % (07/10/24 0909) Vital Signs (24h Range):  Temp:  [97.1 °F (36.2 °C)-98.7 °F (37.1 °C)] 97.1 °F (36.2 °C)  Pulse:  [72-93] 93  Resp:  [18] 18  SpO2:  [92 %-96 %] 95 %  BP: (105-168)/(59-93) 128/59   Weight: 93.7 kg (206 lb 8 oz)  Body mass index is 33.33 kg/m².  SpO2: 95 %       Intake/Output Summary (Last 24 hours) at 7/10/2024 1055  Last data filed at 7/10/2024 0635  Gross per 24 hour   Intake 600 ml   Output 1200 ml   Net -600 ml     Lines/Drains/Airways       Peripheral Intravenous Line  Duration                  Peripheral IV -  Single Lumen 07/08/24 1555 20 G Right Antecubital 1 day                  Significant Labs:  Recent Results (from the past 72 hour(s))   COVID/FLU A&B PCR    Collection Time: 07/08/24  3:24 PM   Result Value Ref Range    Influenza A PCR Not Detected Not Detected    Influenza B PCR Not Detected Not Detected    SARS-CoV-2 PCR Not Detected Not Detected, Negative   APTT    Collection Time: 07/08/24  3:49 PM   Result Value Ref Range    PTT 31.9 23.2 - 33.7 seconds   Comprehensive metabolic panel    Collection Time: 07/08/24  3:49 PM   Result Value Ref Range    Sodium 141 136 - 145 mmol/L    Potassium 3.3 (L) 3.5 - 5.1 mmol/L    Chloride 101 98 - 107 mmol/L    CO2 30 23 - 31 mmol/L    Glucose 188 (H) 82 - 115 mg/dL    Blood Urea Nitrogen 23.4 (H) 9.8 - 20.1 mg/dL    Creatinine 1.25 (H) 0.55 - 1.02 mg/dL    Calcium 10.0 8.4 - 10.2 mg/dL    Protein Total 7.7 (H) 5.8 - 7.6 gm/dL    Albumin 3.6 3.4 - 4.8 g/dL    Globulin 4.1 (H) 2.4 - 3.5 gm/dL    Albumin/Globulin Ratio 0.9 (L) 1.1 - 2.0 ratio    Bilirubin Total 0.5 <=1.5 mg/dL    ALP 78 40 - 150 unit/L    ALT 12 0 - 55 unit/L    AST 19 5 - 34 unit/L    eGFR 43 mL/min/1.73/m2    Anion Gap 10.0 mEq/L    BUN/Creatinine Ratio 19    Brain natriuretic peptide    Collection Time: 07/08/24  3:49 PM   Result Value Ref Range    Natriuretic Peptide 194.2 (H) <=100.0 pg/mL   Protime-INR    Collection Time: 07/08/24  3:49 PM   Result Value Ref Range    PT 17.0 (H) 12.5 - 14.5 seconds    INR 1.4 (H) <=1.3   Troponin I    Collection Time: 07/08/24  3:49 PM   Result Value Ref Range    Troponin-I <0.010 0.000 - 0.045 ng/mL   CBC with Differential    Collection Time: 07/08/24  3:49 PM   Result Value Ref Range    WBC 6.91 4.50 - 11.50 x10(3)/mcL    RBC 4.78 4.20 - 5.40 x10(6)/mcL    Hgb 11.5 (L) 12.0 - 16.0 g/dL    Hct 38.7 37.0 - 47.0 %    MCV 81.0 80.0 - 94.0 fL    MCH 24.1 (L) 27.0 - 31.0 pg    MCHC 29.7 (L) 33.0 - 36.0 g/dL    RDW 16.5 11.5 - 17.0 %    Platelet 408 (H) 130 - 400 x10(3)/mcL     MPV 10.8 (H) 7.4 - 10.4 fL    Neut % 76.1 %    Lymph % 14.0 %    Mono % 8.2 %    Eos % 1.0 %    Basophil % 0.4 %    Lymph # 0.97 0.6 - 4.6 x10(3)/mcL    Neut # 5.25 2.1 - 9.2 x10(3)/mcL    Mono # 0.57 0.1 - 1.3 x10(3)/mcL    Eos # 0.07 0 - 0.9 x10(3)/mcL    Baso # 0.03 <=0.2 x10(3)/mcL    IG# 0.02 0 - 0.04 x10(3)/mcL    IG% 0.3 %    NRBC% 0.0 %   D-Dimer, Quantitative    Collection Time: 07/08/24  3:49 PM   Result Value Ref Range    D-Dimer 2.02 (H) 0.00 - 0.50 ug/mL FEU   EKG 12-lead    Collection Time: 07/08/24  6:19 PM   Result Value Ref Range    QRS Duration 160 ms    [facility] QTC Calculation 503 ms   Comprehensive metabolic panel    Collection Time: 07/09/24  3:44 AM   Result Value Ref Range    Sodium 142 136 - 145 mmol/L    Potassium 3.3 (L) 3.5 - 5.1 mmol/L    Chloride 102 98 - 107 mmol/L    CO2 29 23 - 31 mmol/L    Glucose 282 (H) 82 - 115 mg/dL    Blood Urea Nitrogen 17.7 9.8 - 20.1 mg/dL    Creatinine 1.08 (H) 0.55 - 1.02 mg/dL    Calcium 9.2 8.4 - 10.2 mg/dL    Protein Total 6.4 5.8 - 7.6 gm/dL    Albumin 3.0 (L) 3.4 - 4.8 g/dL    Globulin 3.4 2.4 - 3.5 gm/dL    Albumin/Globulin Ratio 0.9 (L) 1.1 - 2.0 ratio    Bilirubin Total 0.5 <=1.5 mg/dL    ALP 66 40 - 150 unit/L    ALT 11 0 - 55 unit/L    AST 20 5 - 34 unit/L    eGFR 51 mL/min/1.73/m2    Anion Gap 11.0 mEq/L    BUN/Creatinine Ratio 16    CBC with Differential    Collection Time: 07/09/24  3:44 AM   Result Value Ref Range    WBC 7.45 4.50 - 11.50 x10(3)/mcL    RBC 4.48 4.20 - 5.40 x10(6)/mcL    Hgb 10.9 (L) 12.0 - 16.0 g/dL    Hct 36.2 (L) 37.0 - 47.0 %    MCV 80.8 80.0 - 94.0 fL    MCH 24.3 (L) 27.0 - 31.0 pg    MCHC 30.1 (L) 33.0 - 36.0 g/dL    RDW 16.5 11.5 - 17.0 %    Platelet 335 130 - 400 x10(3)/mcL    MPV 10.6 (H) 7.4 - 10.4 fL    Neut % 80.4 %    Lymph % 8.7 %    Mono % 9.7 %    Eos % 0.4 %    Basophil % 0.4 %    Lymph # 0.65 0.6 - 4.6 x10(3)/mcL    Neut # 5.99 2.1 - 9.2 x10(3)/mcL    Mono # 0.72 0.1 - 1.3 x10(3)/mcL    Eos # 0.03 0 - 0.9  x10(3)/mcL    Baso # 0.03 <=0.2 x10(3)/mcL    IG# 0.03 0 - 0.04 x10(3)/mcL    IG% 0.4 %    NRBC% 0.0 %   Magnesium    Collection Time: 07/09/24  3:44 AM   Result Value Ref Range    Magnesium Level 1.70 1.60 - 2.60 mg/dL   Echo    Collection Time: 07/09/24  8:23 AM   Result Value Ref Range    BSA 2.06 m2    Wiley's Biplane MOD Ejection Fraction 62 %    A2C EF 64 %    A4C EF 59 %    LVOT stroke volume 63.70 cm3    LVIDd 4.30 3.5 - 6.0 cm    LV Systolic Volume 29.60 mL    LV Systolic Volume Index 14.7 mL/m2    LVIDs 2.80 2.1 - 4.0 cm    LV ESV A2C 89.30 mL    LV Diastolic Volume 83.10 mL    LV ESV A4C 133.00 mL    LV Diastolic Volume Index 41.14 mL/m2    LV EDV A2C 88.5 mL    LV EDV A4C 94.50 mL    Left Ventricular End Systolic Volume by Teichholz Method 29.60 mL    Left Ventricular End Diastolic Volume by Teichholz Method 83.10 mL    IVS 1.10 0.6 - 1.1 cm    LVOT diameter 2.10 cm    LVOT area 3.5 cm2    FS 35 28 - 44 %    Left Ventricle Relative Wall Thickness 0.51 cm    Posterior Wall 1.10 0.6 - 1.1 cm    LV mass 162.94 g    LV Mass Index 81 g/m2    MV Peak E Francis 1.59 m/s    TDI LATERAL 0.07 m/s    TDI SEPTAL 0.07 m/s    E/E' ratio 22.71 m/s    TR Max Francis 2.69 m/s    LV SEPTAL E/E' RATIO 22.71 m/s    LV LATERAL E/E' RATIO 22.71 m/s    LVOT peak francis 0.94 m/s    Left Ventricular Outflow Tract Mean Velocity 0.65 cm/s    Left Ventricular Outflow Tract Mean Gradient 2.00 mmHg    TAPSE 1.87 cm    RV/LV Ratio 0.84 cm    LA size 5.10 cm    LA volume (mod) 118.00 cm3    LA Volume Index (Mod) 58.4 mL/m2    RA Width 4.50 cm    AV mean gradient 19 mmHg    AV peak gradient 32 mmHg    Ao peak francis 2.83 m/s    Ao VTI 49.70 cm    LVOT peak VTI 18.40 cm    AV valve area 1.28 cm²    AV Velocity Ratio 0.33     AV index (prosthetic) 0.37     HUBERT by Velocity Ratio 1.15 cm²    MV mean gradient 6 mmHg    MV peak gradient 10 mmHg    MV valve area by continuity eq 3.05 cm2    MV VTI 20.9 cm    Triscuspid Valve Regurgitation Peak Gradient 29  mmHg    Ascending aorta 3.00 cm    Mean e' 0.07 m/s    ZLVIDS -2.10     ZLVIDD -3.25     LA area A4C 32.80 cm2    LA area A2C 25.20 cm2    RVDD 3.60 cm   POCT glucose    Collection Time: 07/09/24 10:50 AM   Result Value Ref Range    POCT Glucose 171 (H) 70 - 110 mg/dL   POCT glucose    Collection Time: 07/09/24  4:22 PM   Result Value Ref Range    POCT Glucose 212 (H) 70 - 110 mg/dL   POCT glucose    Collection Time: 07/09/24  8:48 PM   Result Value Ref Range    POCT Glucose 119 (H) 70 - 110 mg/dL   Basic Metabolic Panel    Collection Time: 07/10/24  4:41 AM   Result Value Ref Range    Sodium 141 136 - 145 mmol/L    Potassium 3.9 3.5 - 5.1 mmol/L    Chloride 101 98 - 107 mmol/L    CO2 30 23 - 31 mmol/L    Glucose 128 (H) 82 - 115 mg/dL    Blood Urea Nitrogen 20.3 (H) 9.8 - 20.1 mg/dL    Creatinine 0.99 0.55 - 1.02 mg/dL    BUN/Creatinine Ratio 21     Calcium 9.6 8.4 - 10.2 mg/dL    Anion Gap 10.0 mEq/L    eGFR 57 mL/min/1.73/m2   Magnesium    Collection Time: 07/10/24  4:41 AM   Result Value Ref Range    Magnesium Level 1.70 1.60 - 2.60 mg/dL     Significant Imaging:  Imaging Results              X-Ray Chest 1 View (Final result)  Result time 07/08/24 16:19:25      Final result by Mckay Chang MD (07/08/24 16:19:25)                   Impression:      Bibasilar opacities with small pleural effusions.      Electronically signed by: Mckay Chang  Date:    07/08/2024  Time:    16:19               Narrative:    EXAMINATION:  XR CHEST 1 VIEW    CLINICAL HISTORY:  Shortness of breath    COMPARISON:  17 June 2024    FINDINGS:  Frontal view of the chest was obtained. Heart is not significantly enlarged.  There is aortic atherosclerosis.  There are bibasilar opacities with small effusions.  There is no pneumothorax.                                    EKG:     Telemetry:      Physical Exam  Vitals reviewed.   Constitutional:       General: She is not in acute distress.     Appearance: She is not ill-appearing.   HENT:       Head: Atraumatic.      Mouth/Throat:      Mouth: Mucous membranes are moist.      Pharynx: Oropharynx is clear.   Cardiovascular:      Rate and Rhythm: Normal rate. Rhythm irregular.      Pulses: Normal pulses.      Heart sounds: S1 normal and S2 normal. No murmur heard.     No friction rub. No gallop.      Comments: Trace edema to mid shin bilaterally  Pulmonary:      Effort: Pulmonary effort is normal.      Breath sounds: Normal breath sounds. No wheezing or rales.   Musculoskeletal:      Right lower leg: Edema present.      Left lower leg: Edema present.   Skin:     General: Skin is warm and dry.      Capillary Refill: Capillary refill takes less than 2 seconds.   Neurological:      Mental Status: She is alert and oriented to person, place, and time.     Home Medications:   No current facility-administered medications on file prior to encounter.     Current Outpatient Medications on File Prior to Encounter   Medication Sig Dispense Refill    albuterol (VENTOLIN HFA) 90 mcg/actuation inhaler Inhale 2 puffs into the lungs every 6 (six) hours as needed for Wheezing. Rescue 18 g 1    colchicine (COLCRYS) 0.6 mg tablet TAKE 2 TABLETS BY MOUTH AT THE FIRST SIGN OF A GOUT FLARE FOLLOWED BY 1 TABLET ONE HOUR LATER 18 tablet 1    colestipoL (COLESTID) 1 gram Tab TAKE 1 TABLET (1 G TOTAL) BY MOUTH 2 (TWO) TIMES DAILY AS NEEDED (WITH MEALS FOR DIARRHEA). 180 tablet 1    flecainide (TAMBOCOR) 50 MG Tab Take 50 mg by mouth once daily.      irbesartan (AVAPRO) 300 MG tablet Take 300 mg by mouth once daily.      latanoprost 0.005 % ophthalmic solution Place 1 drop into both eyes nightly.      lovastatin (MEVACOR) 20 MG tablet SMARTSI Tablet(s) By Mouth Every Evening      metFORMIN (GLUCOPHAGE-XR) 500 MG ER 24hr tablet TAKE 1 TABLET BY MOUTH EVERY DAY 90 tablet 1    metoprolol succinate (TOPROL-XL) 25 MG 24 hr tablet Take 25 mg by mouth once daily.      semaglutide (OZEMPIC) 0.25 mg or 0.5 mg (2 mg/3 mL) pen injector Inject  0.5 mg into the skin every 7 days. 3 mL 2    XARELTO 20 mg Tab Take 20 mg by mouth once daily.      clonazePAM (KLONOPIN) 0.5 MG tablet Take 1 tablet (0.5 mg total) by mouth daily as needed for Anxiety (at bedtime). 30 tablet 1    ergocalciferol (ERGOCALCIFEROL) 50,000 unit Cap Take 1 capsule (50,000 Units total) by mouth every 7 days. 24 capsule 1    estradioL (ESTRACE) 1 MG tablet Take 1 mg by mouth.      fluticasone-salmeterol 230-21 mcg/dose (ADVAIR HFA) 230-21 mcg/actuation HFAA inhaler Inhale 2 puffs into the lungs 2 (two) times daily. Controller 12 g 3    nitrofurantoin, macrocrystal-monohydrate, (MACROBID) 100 MG capsule Take 1 capsule (100 mg total) by mouth 2 (two) times daily. 14 capsule 0    valACYclovir (VALTREX) 1000 MG tablet Take 2 tablets (2,000 mg total) by mouth 2 (two) times daily. As needed for outbreak for 2 days 32 tablet 1     Current Inpatient Medications:    Current Facility-Administered Medications:     acetaminophen tablet 650 mg, 650 mg, Oral, Q8H PRN, Baldomero Barboza MD    acetaminophen tablet 650 mg, 650 mg, Oral, Q8H PRN, Baldomero Barboza MD    atorvastatin tablet 20 mg, 20 mg, Oral, QHS, Jeison Garrett MD, 20 mg at 07/09/24 2048    clonazePAM tablet 0.5 mg, 0.5 mg, Oral, Daily PRN, Jeison Garrett MD    cloNIDine tablet 0.1 mg, 0.1 mg, Oral, TID PRN, Baldomero Barboza MD, 0.1 mg at 07/10/24 0422    dextrose 10% bolus 125 mL 125 mL, 12.5 g, Intravenous, PRN, Baldomero Barboza MD    dextrose 10% bolus 250 mL 250 mL, 25 g, Intravenous, PRN, Baldomero Barboza MD    flecainide tablet 50 mg, 50 mg, Oral, Daily, Jeison Garrett MD, 50 mg at 07/10/24 0906    fluticasone furoate-vilanteroL 200-25 mcg/dose diskus inhaler 1 puff, 1 puff, Inhalation, Daily, Jeison Garrett MD, 1 puff at 07/10/24 0906    furosemide injection 40 mg, 40 mg, Intravenous, Daily, Baldomero Barboza MD, 40 mg at 07/10/24 0906    glucagon (human recombinant) injection 1 mg, 1 mg,  Intramuscular, PRN, Baldomero Barboza MD    glucose chewable tablet 16 g, 16 g, Oral, PRN, Baldomero Barboza MD    glucose chewable tablet 24 g, 24 g, Oral, PRN, Baldomero Barboza MD    insulin aspart U-100 injection 0-10 Units, 0-10 Units, Subcutaneous, QID (AC + HS) PRN, Baldomero Barboza MD, 4 Units at 07/09/24 1713    latanoprost 0.005 % ophthalmic solution 1 drop, 1 drop, Both Eyes, Nightly, Jeison Garrett MD, 1 drop at 07/09/24 2048    melatonin tablet 6 mg, 6 mg, Oral, Nightly PRN, Baldomero Barboza MD    metFORMIN tablet 500 mg, 500 mg, Oral, BID WM, Jeison Garrett MD, 500 mg at 07/10/24 0904    metoprolol succinate (TOPROL-XL) 24 hr tablet 25 mg, 25 mg, Oral, Daily, Jeison Garrett MD, 25 mg at 07/10/24 0905    rivaroxaban tablet 20 mg, 20 mg, Oral, Daily, Baldomero Barboza MD, 20 mg at 07/10/24 0904    sodium chloride 0.9% flush 10 mL, 10 mL, Intravenous, PRN, Baldomero Barboza MD  VTE Risk Mitigation (From admission, onward)           Ordered     rivaroxaban tablet 20 mg  Daily         07/08/24 2221     IP VTE HIGH RISK PATIENT  Once         07/08/24 2221     Place sequential compression device  Until discontinued         07/08/24 2221                  Assessment:   Paroxysmal atrial fibrillation (Currently CVR)        -CHADsVASC--5        -On Xarelto  CHF Exacerbation       -LVEF 60-65%       -Indeterminate diastolic function  OSMAR (Improving)  Hx RBBB  VHD     - Mild AS, TR, and NM     -Moderate MR  HTN  Type 2 diabetes  Asthma  bilateral carotid bruits  HLD  Major depressive disorder    Plan:   - Continue Lasix 40 mg IV daily  - Strict I&Os  - Fluid restriction <2L daily, low salt diet  - Daily weights  - Continue metoprolol 25 mg qd and flecainide 50 mg qd for rate control  - DVT ppx: Xarelto (Xarelto also for stroke risk reduction in the setting of PAF)  -Give 2 gm IV MgSo4 x 2 today (total 4 gm)  -Give 40 mEq KCL PO x 1 today  -Am Labs: CBC, CMP, Mg  -Troponin  negative and Echo unchanged from previous other than EF increase to 60-65% from 55%.  -Continue to diurese then will likely do further work-up as outpatient if deemed necessary  -Will continue to follow      MARIMAR Hein  Cardiology  [facility] - 6th Floor Medical Telemetry  07/10/2024

## 2024-07-10 NOTE — DISCHARGE SUMMARY
[facility]  Hospital Medicine Discharge Summary    Admit Date: 7/8/2024  Discharge Date and Time: 7/10/07801:50 PM  Admitting Physician:  Team  Discharging Physician: Jeison Garrett MD.  Primary Care Physician: Júnior Villalobos MD  Consults: Cardiology and Hospital Medicine    Discharge Diagnoses:  Hypertensive urgency , improving  diastolic heart failure exacerbation 2/2 above , resolved    Acute kidney injury, improving  Hypokalemia , replete   Paroxysmal Afib on Xarelto           Hospital Course:   CHIEF COMPLAINT   Shortness of Breath (Pt c/o SOB x 4 weeks, sent by Dr. Villalobos for unstable afib . Denies chest pain. O2 94% on RA)     HISTORY OF PRESENT ILLNESS:   Patient is an 83-year-old female with multiple comorbidities as listed below including HTN, DM2, gout, asthma, CAD, AFib on Xarelto, who presented to the ER with complaints of 4 weeks worsening dyspnea.  She saw her PCP today who who noted with ambulation she was having presyncopal episodes and tachycardia along with dyspnea and therefore referred her to the ER for further evaluation.       She arrived to the ER afebrile, significantly hypertensive, maintaining adequate sats on room air.  EKG showed atrial fibrillation.  Laboratory work was significant for a BNP of 190.  Chest x-ray showed bibasilar opacities with small effusions.  She was given IV Lasix and IV antihypertensives admitted to the hospitalist service for further management.    She was diuresed and discharged home      Pt was seen and examined on the day of discharge  Vitals:  VITAL SIGNS: 24 HRS MIN & MAX LAST   Temp  Min: 97.1 °F (36.2 °C)  Max: 98.7 °F (37.1 °C) 97.9 °F (36.6 °C)   BP  Min: 100/43  Max: 164/62 (!) 100/43   Pulse  Min: 72  Max: 95  95   Resp  Min: 18  Max: 18 18   SpO2  Min: 92 %  Max: 96 % 95 %       Physical Exam:  GENERAL: awake, alert, oriented and in no acute distress, non-toxic appearing   HEENT: normocephalic atraumatic   NECK: supple    LUNGS:  Bibasilar crackles, no accessory muscle use   CVS:  Irregularly irregular, normal peripheral perfusion, trace edema  ABD: Soft, non-tender, non-distended, bowel sounds present  EXTREMITIES: no clubbing or cyanosis  SKIN: Warm, dry.   NEURO: alert and oriented, grossly without focal deficits   PSYCHIATRIC: Cooperative    Procedures Performed: No admission procedures for hospital encounter.     Significant Diagnostic Studies: See Full reports for all details    Recent Labs   Lab 07/08/24  1549 07/09/24  0344   WBC 6.91 7.45   RBC 4.78 4.48   HGB 11.5* 10.9*   HCT 38.7 36.2*   MCV 81.0 80.8   MCH 24.1* 24.3*   MCHC 29.7* 30.1*   RDW 16.5 16.5   * 335   MPV 10.8* 10.6*       Recent Labs   Lab 07/08/24  1549 07/09/24  0344 07/10/24  0441    142 141   K 3.3* 3.3* 3.9    102 101   CO2 30 29 30   BUN 23.4* 17.7 20.3*   CREATININE 1.25* 1.08* 0.99   CALCIUM 10.0 9.2 9.6   MG  --  1.70 1.70   ALBUMIN 3.6 3.0*  --    ALKPHOS 78 66  --    ALT 12 11  --    AST 19 20  --    BILITOT 0.5 0.5  --         Microbiology Results (last 7 days)       ** No results found for the last 168 hours. **             Echo    Left Ventricle: There is mild concentric hypertrophy. There is normal   systolic function with a visually estimated ejection fraction of 60 - 65%.   There is indeterminate diastolic function.Elevated left ventricular   filling pressure.    Right Ventricle: Normal right ventricular cavity size. Systolic   function is normal.    Left Atrium: Left atrium is severely dilated.    Aortic Valve: Mildly calcified cusps. Mildly restricted motion. There   is mild stenosis. Aortic valve area by VTI is 1.28 cm². Aortic valve peak   velocity is 2.83 m/s. Mean gradient is 19 mmHg. The dimensionless index is   0.37.    Mitral Valve: Mildly thickened leaflets. There is moderate   regurgitation.    Tricuspid Valve: There is mild regurgitation. The estimated PA systolic   pressure is at least 29 mmHg.    Pulmonic  Valve: There is mild regurgitation.    Pericardium: There is no pericardial effusion.         Medication List        START taking these medications      furosemide 40 MG tablet  Commonly known as: LASIX  Take 1 tablet (40 mg total) by mouth once daily.            CONTINUE taking these medications      albuterol 90 mcg/actuation inhaler  Commonly known as: VENTOLIN HFA  Inhale 2 puffs into the lungs every 6 (six) hours as needed for Wheezing. Rescue     clonazePAM 0.5 MG tablet  Commonly known as: KlonoPIN  Take 1 tablet (0.5 mg total) by mouth daily as needed for Anxiety (at bedtime).     colchicine 0.6 mg tablet  Commonly known as: COLCRYS  TAKE 2 TABLETS BY MOUTH AT THE FIRST SIGN OF A GOUT FLARE FOLLOWED BY 1 TABLET ONE HOUR LATER     colestipoL 1 gram Tab  Commonly known as: COLESTID  TAKE 1 TABLET (1 G TOTAL) BY MOUTH 2 (TWO) TIMES DAILY AS NEEDED (WITH MEALS FOR DIARRHEA).     ergocalciferol 50,000 unit Cap  Commonly known as: ERGOCALCIFEROL  Take 1 capsule (50,000 Units total) by mouth every 7 days.     estradioL 1 MG tablet  Commonly known as: ESTRACE     flecainide 50 MG Tab  Commonly known as: TAMBOCOR     fluticasone-salmeterol 230-21 mcg/dose 230-21 mcg/actuation Hfaa inhaler  Commonly known as: ADVAIR HFA  Inhale 2 puffs into the lungs 2 (two) times daily. Controller     latanoprost 0.005 % ophthalmic solution     lovastatin 20 MG tablet  Commonly known as: MEVACOR     metFORMIN 500 MG ER 24hr tablet  Commonly known as: GLUCOPHAGE-XR  TAKE 1 TABLET BY MOUTH EVERY DAY     metoprolol succinate 25 MG 24 hr tablet  Commonly known as: TOPROL-XL     nitrofurantoin (macrocrystal-monohydrate) 100 MG capsule  Commonly known as: MACROBID  Take 1 capsule (100 mg total) by mouth 2 (two) times daily.     OZEMPIC 0.25 mg or 0.5 mg (2 mg/3 mL) pen injector  Generic drug: semaglutide  Inject 0.5 mg into the skin every 7 days.     XARELTO 20 mg Tab  Generic drug: rivaroxaban            STOP taking these medications       irbesartan 300 MG tablet  Commonly known as: AVAPRO     valACYclovir 1000 MG tablet  Commonly known as: VALTREX               Where to Get Your Medications        These medications were sent to [facility] [address] [place] [address], [facility] [address]      Phone: [phone]   furosemide 40 MG tablet          Explained in detail to the patient about the discharge plan, medications, and follow-up visits. Pt understands and agrees with the treatment plan  Discharge Disposition:     Discharged Condition: stable  Diet-   Dietary Orders (From admission, onward)       Start     Ordered    07/09/24 1103  Diet diabetic Low Chol/Sat Fat; 2800 Calorie  Diet effective now        Question Answer Comment   Diet Modifier: Low Chol/Sat Fat    Total calories: 2800 Calorie        07/09/24 1104                   Medications Per DC med rec  Activities as tolerated   Follow-up Information       Júnior Villalobos MD. Schedule an appointment as soon as possible for a visit in 2 week(s).    Specialty: Family Medicine  Contact information:  [address]  [place] [id number] [phone]               Cameron Hall MD. Schedule an appointment as soon as possible for a visit in 1 week(s).    Specialties: Cardiovascular Disease, Cardiology  Contact information:  [address] [facility] [place] [place] [id number] [phone]                           For further questions contact hospitalist office    Discharge time 33 minutes    For worsening symptoms, chest pain, shortness of breath, increased abdominal pain, high grade fever, stroke or stroke like symptoms, immediately go to the nearest Emergency Room or call 911 as soon as possible.      Jeison Soler M.D on 7/10/2024. at 1:50 PM.

## 2024-07-11 ENCOUNTER — PATIENT OUTREACH (OUTPATIENT)
Dept: ADMINISTRATIVE | Facility: CLINIC | Age: 84
End: 2024-07-11
Payer: MEDICARE

## 2024-07-11 NOTE — PROGRESS NOTES
C3 nurse spoke with Ladan Lowe  for a [facility] post hospital discharge follow up call. The patient has a scheduled [facility] appointment with Cameron Hall MD (Cardiovascular Disease) on 7/17/2024; @10:00

## 2024-08-01 ENCOUNTER — PATIENT MESSAGE (OUTPATIENT)
Dept: ADMINISTRATIVE | Facility: OTHER | Age: 84
End: 2024-08-01
Payer: MEDICARE

## 2024-10-28 PROBLEM — E11.9 DIABETES MELLITUS WITHOUT COMPLICATION: Status: ACTIVE | Noted: 2024-03-25

## 2024-10-28 PROBLEM — E78.2 MIXED HYPERLIPIDEMIA: Status: ACTIVE | Noted: 2022-08-08

## 2025-04-28 PROBLEM — N39.45 CONTINUOUS LEAKAGE OF URINE: Status: ACTIVE | Noted: 2025-04-28

## 2025-05-28 ENCOUNTER — TELEPHONE (OUTPATIENT)
Dept: PHARMACY | Facility: CLINIC | Age: 85
End: 2025-05-28
Payer: MEDICARE

## 2025-05-29 NOTE — TELEPHONE ENCOUNTER
[facility]/Population Health Chart Review & Patient Outreach Details For Medication Adherence Project    Reason for Outreach Encounter: 3rd Party payor non-compliance report ([employer], [employer], C, etc)  2.  Patient Outreach Method: Reviewed patient chart   3.   Medication in question:    Diabetes Medications              metFORMIN (GLUCOPHAGE-XR) 500 MG ER 24hr tablet TAKE 1 TABLET (500 MG TOTAL) BY MOUTH ONCE DAILY.    semaglutide (OZEMPIC) 1 mg/dose (4 mg/3 mL) Inject 1 mg into the skin every 7 days.                 metformin  last filled  3/25 for 100 day supply      4.  Reviewed and or Updates Made To: Patient Chart  5. Outreach Outcomes and/or actions taken: Patient filled medication and is on track to be adherent  Additional Notes: